# Patient Record
Sex: MALE | Race: OTHER | HISPANIC OR LATINO | URBAN - METROPOLITAN AREA
[De-identification: names, ages, dates, MRNs, and addresses within clinical notes are randomized per-mention and may not be internally consistent; named-entity substitution may affect disease eponyms.]

---

## 2022-02-08 ENCOUNTER — INPATIENT (INPATIENT)
Facility: HOSPITAL | Age: 72
LOS: 1 days | Discharge: HOME | DRG: 309 | End: 2022-02-10
Attending: HOSPITALIST | Admitting: STUDENT IN AN ORGANIZED HEALTH CARE EDUCATION/TRAINING PROGRAM
Payer: MEDICARE

## 2022-02-08 VITALS
TEMPERATURE: 98 F | OXYGEN SATURATION: 99 % | WEIGHT: 169.98 LBS | HEIGHT: 70 IN | HEART RATE: 97 BPM | RESPIRATION RATE: 20 BRPM

## 2022-02-08 DIAGNOSIS — Z78.9 OTHER SPECIFIED HEALTH STATUS: Chronic | ICD-10-CM

## 2022-02-08 DIAGNOSIS — I48.91 UNSPECIFIED ATRIAL FIBRILLATION: ICD-10-CM

## 2022-02-08 LAB
ALBUMIN SERPL ELPH-MCNC: 3.8 G/DL — SIGNIFICANT CHANGE UP (ref 3.3–5.2)
ALP SERPL-CCNC: 78 U/L — SIGNIFICANT CHANGE UP (ref 40–120)
ALT FLD-CCNC: 15 U/L — SIGNIFICANT CHANGE UP
ANION GAP SERPL CALC-SCNC: 8 MMOL/L — SIGNIFICANT CHANGE UP (ref 5–17)
AST SERPL-CCNC: 19 U/L — SIGNIFICANT CHANGE UP
BASOPHILS # BLD AUTO: 0.06 K/UL — SIGNIFICANT CHANGE UP (ref 0–0.2)
BASOPHILS NFR BLD AUTO: 0.8 % — SIGNIFICANT CHANGE UP (ref 0–2)
BILIRUB SERPL-MCNC: 1.5 MG/DL — SIGNIFICANT CHANGE UP (ref 0.4–2)
BUN SERPL-MCNC: 18 MG/DL — SIGNIFICANT CHANGE UP (ref 8–20)
CALCIUM SERPL-MCNC: 9 MG/DL — SIGNIFICANT CHANGE UP (ref 8.6–10.2)
CHLORIDE SERPL-SCNC: 105 MMOL/L — SIGNIFICANT CHANGE UP (ref 98–107)
CO2 SERPL-SCNC: 26 MMOL/L — SIGNIFICANT CHANGE UP (ref 22–29)
CREAT SERPL-MCNC: 0.85 MG/DL — SIGNIFICANT CHANGE UP (ref 0.5–1.3)
D DIMER BLD IA.RAPID-MCNC: <150 NG/ML DDU — SIGNIFICANT CHANGE UP
EOSINOPHIL # BLD AUTO: 0.26 K/UL — SIGNIFICANT CHANGE UP (ref 0–0.5)
EOSINOPHIL NFR BLD AUTO: 3.3 % — SIGNIFICANT CHANGE UP (ref 0–6)
GLUCOSE SERPL-MCNC: 95 MG/DL — SIGNIFICANT CHANGE UP (ref 70–99)
HCT VFR BLD CALC: 44.5 % — SIGNIFICANT CHANGE UP (ref 39–50)
HGB BLD-MCNC: 15.1 G/DL — SIGNIFICANT CHANGE UP (ref 13–17)
IMM GRANULOCYTES NFR BLD AUTO: 0.3 % — SIGNIFICANT CHANGE UP (ref 0–1.5)
LYMPHOCYTES # BLD AUTO: 1.76 K/UL — SIGNIFICANT CHANGE UP (ref 1–3.3)
LYMPHOCYTES # BLD AUTO: 22.4 % — SIGNIFICANT CHANGE UP (ref 13–44)
MCHC RBC-ENTMCNC: 30.8 PG — SIGNIFICANT CHANGE UP (ref 27–34)
MCHC RBC-ENTMCNC: 33.9 GM/DL — SIGNIFICANT CHANGE UP (ref 32–36)
MCV RBC AUTO: 90.6 FL — SIGNIFICANT CHANGE UP (ref 80–100)
MONOCYTES # BLD AUTO: 0.78 K/UL — SIGNIFICANT CHANGE UP (ref 0–0.9)
MONOCYTES NFR BLD AUTO: 9.9 % — SIGNIFICANT CHANGE UP (ref 2–14)
NEUTROPHILS # BLD AUTO: 4.96 K/UL — SIGNIFICANT CHANGE UP (ref 1.8–7.4)
NEUTROPHILS NFR BLD AUTO: 63.3 % — SIGNIFICANT CHANGE UP (ref 43–77)
NT-PROBNP SERPL-SCNC: 649 PG/ML — HIGH (ref 0–300)
PLATELET # BLD AUTO: 104 K/UL — LOW (ref 150–400)
POTASSIUM SERPL-MCNC: 3.7 MMOL/L — SIGNIFICANT CHANGE UP (ref 3.5–5.3)
POTASSIUM SERPL-SCNC: 3.7 MMOL/L — SIGNIFICANT CHANGE UP (ref 3.5–5.3)
PROT SERPL-MCNC: 6.4 G/DL — LOW (ref 6.6–8.7)
RBC # BLD: 4.91 M/UL — SIGNIFICANT CHANGE UP (ref 4.2–5.8)
RBC # FLD: 13 % — SIGNIFICANT CHANGE UP (ref 10.3–14.5)
SARS-COV-2 RNA SPEC QL NAA+PROBE: SIGNIFICANT CHANGE UP
SODIUM SERPL-SCNC: 139 MMOL/L — SIGNIFICANT CHANGE UP (ref 135–145)
TROPONIN T SERPL-MCNC: <0.01 NG/ML — SIGNIFICANT CHANGE UP (ref 0–0.06)
TSH SERPL-MCNC: 0.78 UIU/ML — SIGNIFICANT CHANGE UP (ref 0.27–4.2)
WBC # BLD: 7.84 K/UL — SIGNIFICANT CHANGE UP (ref 3.8–10.5)
WBC # FLD AUTO: 7.84 K/UL — SIGNIFICANT CHANGE UP (ref 3.8–10.5)

## 2022-02-08 PROCEDURE — 99284 EMERGENCY DEPT VISIT MOD MDM: CPT

## 2022-02-08 PROCEDURE — 71045 X-RAY EXAM CHEST 1 VIEW: CPT | Mod: 26

## 2022-02-08 PROCEDURE — 99222 1ST HOSP IP/OBS MODERATE 55: CPT

## 2022-02-08 PROCEDURE — 70450 CT HEAD/BRAIN W/O DYE: CPT | Mod: 26,MA

## 2022-02-08 PROCEDURE — 70551 MRI BRAIN STEM W/O DYE: CPT | Mod: 26

## 2022-02-08 PROCEDURE — 99223 1ST HOSP IP/OBS HIGH 75: CPT

## 2022-02-08 PROCEDURE — 93306 TTE W/DOPPLER COMPLETE: CPT | Mod: 26

## 2022-02-08 RX ORDER — INFLUENZA VIRUS VACCINE 15; 15; 15; 15 UG/.5ML; UG/.5ML; UG/.5ML; UG/.5ML
0.7 SUSPENSION INTRAMUSCULAR ONCE
Refills: 0 | Status: DISCONTINUED | OUTPATIENT
Start: 2022-02-08 | End: 2022-02-10

## 2022-02-08 RX ORDER — METOPROLOL TARTRATE 50 MG
25 TABLET ORAL ONCE
Refills: 0 | Status: COMPLETED | OUTPATIENT
Start: 2022-02-08 | End: 2022-02-08

## 2022-02-08 RX ORDER — ENOXAPARIN SODIUM 100 MG/ML
77 INJECTION SUBCUTANEOUS EVERY 12 HOURS
Refills: 0 | Status: DISCONTINUED | OUTPATIENT
Start: 2022-02-08 | End: 2022-02-09

## 2022-02-08 RX ORDER — ASPIRIN/CALCIUM CARB/MAGNESIUM 324 MG
81 TABLET ORAL DAILY
Refills: 0 | Status: DISCONTINUED | OUTPATIENT
Start: 2022-02-08 | End: 2022-02-08

## 2022-02-08 RX ORDER — ENOXAPARIN SODIUM 100 MG/ML
40 INJECTION SUBCUTANEOUS DAILY
Refills: 0 | Status: DISCONTINUED | OUTPATIENT
Start: 2022-02-08 | End: 2022-02-08

## 2022-02-08 RX ORDER — METOPROLOL TARTRATE 50 MG
5 TABLET ORAL EVERY 6 HOURS
Refills: 0 | Status: DISCONTINUED | OUTPATIENT
Start: 2022-02-08 | End: 2022-02-10

## 2022-02-08 RX ORDER — ASPIRIN/CALCIUM CARB/MAGNESIUM 324 MG
81 TABLET ORAL DAILY
Refills: 0 | Status: DISCONTINUED | OUTPATIENT
Start: 2022-02-08 | End: 2022-02-09

## 2022-02-08 RX ORDER — METOPROLOL TARTRATE 50 MG
25 TABLET ORAL EVERY 6 HOURS
Refills: 0 | Status: DISCONTINUED | OUTPATIENT
Start: 2022-02-08 | End: 2022-02-09

## 2022-02-08 RX ADMIN — Medication 25 MILLIGRAM(S): at 18:29

## 2022-02-08 RX ADMIN — ENOXAPARIN SODIUM 77 MILLIGRAM(S): 100 INJECTION SUBCUTANEOUS at 18:30

## 2022-02-08 RX ADMIN — Medication 25 MILLIGRAM(S): at 11:24

## 2022-02-08 NOTE — ED ADULT NURSE NOTE - OBJECTIVE STATEMENT
Patient states hes been having chest pain on and off for last 2 weeks with SOB and right arm weakness. States also having back pain and loss ob balance

## 2022-02-08 NOTE — ED PROVIDER NOTE - PHYSICAL EXAMINATION
Head: atraumatic, normacephalic  Face: atraumatic, no crepitus no orbiral/maxillary/mandibular ttp  throat: uvula midline no exudates  eyes: perrla eomi  heart: rrr s1s2  lungs: ctab  abd: soft, nt nd +bs no rebound/guarding no cva ttp  skin: warm  LE: no swelling, no calf ttp  back: no midline cervical/thoracic/lumbar ttp  NEURO: aaox 3 cn iii-xii intact strength 5/5 normal finger to nose

## 2022-02-08 NOTE — H&P ADULT - HISTORY OF PRESENT ILLNESS
Patient is a 70 yo M w/ no known PMH presenting with chief complaint of palpitations. History obtained from patient, chart and patient's daughter at bedside. Patient is visiting for [place] and noted to have symptoms of chest pain, SOB, palpitations, and dizziness starting 3 weeks ago. The symptoms have been intermittent. The chest pain is radiating to the shoulders. Starting 3 days ago, patient developed symptoms of slurred speech and confusion. Per daughter, patient has "spaced out" and takes a long time to answer questions. She denies any underlying medical history and he is on no medications. At the time of evaluation, patient states he feels better.    Patient denies fever, chills, orthopnea/PND, LE swelling, abdominal pain, cough, wheezing, nausea, vomiting, diarrhea, constipation, syncope, bloody bowel movements, melena, weakness,  Patient is a 70 yo M w/ no known PMH presenting with chief complaint of palpitations. History obtained from patient, chart and patient's daughter at bedside. Patient is visiting for [place] and noted to have symptoms of chest pain, SOB, palpitations, and dizziness starting 3 weeks ago. The symptoms have been intermittent. The chest pain is radiating to the shoulders. Starting 3 days ago, patient developed symptoms of slurred speech and confusion. Per daughter, patient has "spaced out" and takes a long time to answer questions. She denies any underlying medical history and he is on no medications. At the time of evaluation, patient states he feels better.    Patient denies fever, chills, orthopnea/PND, LE swelling, abdominal pain, cough, wheezing, nausea, vomiting, diarrhea, constipation, syncope, bloody bowel movements, melena, weakness.

## 2022-02-08 NOTE — ED PROVIDER NOTE - OBJECTIVE STATEMENT
70yo M no PMhx pw cp/sob/palpitations x 3 days. +lightheadedness. +exertional dyspnea. denies orthopnea. daughter notes that pt lives in [place] came 3 months ago was supposed to go back 3 days ago but was not feeling well, also had an episode of slurred speech and confusion 3 days ago. denies weakness. as per daughter notes pt is still with episodes of confusion since - takes a long time to answer sometimes or says he forgot what he was going to say.  as per daughter also notes unsteady gait x 3 weeks. Denies f/c/n/v/cough/rash/headacheabd.pain/d/c/dysuria/hematuria. no sick contacts. no hx of dvt/pe

## 2022-02-08 NOTE — CONSULT NOTE ADULT - SUBJECTIVE AND OBJECTIVE BOX
[facility]ARDI[facility]                                                       [facility]                                                           Office:  [address] [address]                                                          Telephone: [phone]. Fax:[phone]                                                                        CARDIOLOGY CONSULTATION NOTE                                                                                             Consult requested by:  Luis Dwyer  Reason for Consultation: new onset Afib  History obtained by: Patient and medical record   obtained: No  Cardiologist: none     COVID Status: negative       Chief complaint:    Patient is a 71y old  Male who presents with a chief complaint of New Onset Afib (2022 13:15)        HPI:  Patient is a 72 yo M w/ no known PMH presents with chief complaint of palpitation with associated dizziness and SOB starting about 3 weeks ago.  Patient is visiting from [place] and was supposed to return home this week but was brought to ED by daughter due to 3 days of intermittent slurred speech, confusion and slow repsonse times.  Per daughter, patient has "spaced out" and takes a long time to answer questions. She denies any underlying medical history and he is on no medications. At the time of evaluation, patient states he feels better. Patient denies fever, chills, orthopnea/PND, LE swelling, abdominal pain, cough, wheezing, nausea, vomiting, diarrhea, constipation, syncope, bloody bowel movements, melena, weakness.       REVIEW OF SYMPTOMS:     CONSTITUTIONAL: No fever, no weight loss, no fatigue, no weight gain   ENMT:  No difficulty hearing, tinnitus, vertigo; No sinus or throat pain  NECK: No pain or stiffness  CARDIOVASCULAR: reports chest pain , pressure like in nature, palpitations, SANTANA and Paroxsymal nocturnal dyspnea (sleeps with 2 pillows) and dizziness  no syncope, , no Orthopnea,   RESPIRATORY: Dyspnea on exertion, no Shortness of breath, no cough, no wheezing  : No dysuria, no hematuria   GI: No dark color stool, no melena, no diarrhea, no constipation, no abdominal pain   NEURO: No headache, no dizziness, no slurred speech   MUSCULOSKELETAL: No joint pain or swelling; No muscle, back, or extremity pain  PSYCH: No agitation, no anxiety.    ALL OTHER REVIEW OF SYSTEMS ARE NEGATIVE.      PREVIOUS DIAGNOSTIC TESTING  ECHO FINDINGS:    PENDING  STRESS FINDINGS:  CATHETERIZATION FINDINGS:         ALLERGIES: Allergies  No Known Allergies    PAST MEDICAL HISTORY  No known health problems    PAST SURGICAL HISTORY  No pertinent past surgical history        FAMILY HISTORY:  Mother -  in childbirth  Father -  of stroke       SOCIAL HISTORY:  Denies smoking/alcohol/drugs    CURRENT MEDICATIONS:  metoprolol tartrate Injectable 5 milliGRAM(s) IV Push every 6 hours PRN   aspirin  chewable  enoxaparin Injectable        HOME MEDICATIONS:      Vital Signs Last 24 Hrs  T(C): 36.4 (2022 08:57), Max: 36.4 (2022 08:57)  T(F): 97.6 (2022 08:57), Max: 97.6 (2022 08:57)  HR: 102 (2022 11:25) (97 - 102)  BP: 122/87 (2022 11:25) (115/77 - 122/87)  BP(mean): --  RR: 20 (2022 08:57) (20 - 20)  SpO2: 99% (2022 08:57) (99% - 99%)      PHYSICAL EXAM:  Constitutional: Comfortable . No acute distress.   HEENT: Atraumatic and normocephalic , neck is supple . no JVD. No carotid bruit. PEERL   CNS: A&Ox3. No focal deficits. EOMI. Cranial nerves II-IX are intact.   Respiratory: CTAB, unlabored   Cardiovascular: S1S2 RRR. No murmur/rubs or gallop.  Gastrointestinal: Soft non-tender and non distended . +Bowel sounds. negative Yeboah's sign.  Extremities: No edema.   Psychiatric: Calm . no agitation.  Skin: No skin rash/ulcers visualized to face, hands or feet.    Intake and output:     LABS:                        15.1   7.84  )-----------( 104      ( 2022 10:28 )             44.5     02-08    139  |  105  |  18.0  ----------------------------<  95  3.7   |  26.0  |  0.85    Ca    9.0      2022 10:28    TPro  6.4<L>  /  Alb  3.8  /  TBili  1.5  /  DBili  x   /  AST  19  /  ALT  15  /  AlkPhos  78  02-08    CARDIAC MARKERS ( 2022 10:28 )  x     / <0.01 ng/mL / x     / x     / x        ;p-BNP=Serum Pro-Brain Natriuretic Peptide: 649 pg/mL ( @ 10:28)          INTERPRETATION OF TELEMETRY: Afib currently rate controlled 80s to 90s  ECG: Reviewed by me.  Afib with RVR    RADIOLOGY & ADDITIONAL STUDIES:    X-ray:  reviewed by me.   CT scan:   MRI:

## 2022-02-08 NOTE — ED ADULT TRIAGE NOTE - AS HEIGHT TYPE
"Transplant Social Work Services Phone Call      Data: Received message that pt/family received a \"[id number]\" form in the mail, had questions about it, requesting call from SW. Called pt, introduced self and role of SW. Discussed reason for call. Pt stated that her mom opened up her mail at home and this form had come and she wondered if there was anything she had to do with it. Explained that I was unfamiliar with that form, but discussed Medicare re-entitlement form. Instructed that if it was this form, she would need to take it to local social security office for Medicare. Explained that she had time to do this and it wasn't urgent (pt is in [place] and will be returning home to [place] soon). Pt plans to review the form further when she gets home to [place] and will call SW back if she has additional questions.   Intervention: Phone call   Assessment: Pt was pleasant and receptive to SW.   Education provided by SW: NA   Plan: SW available to support and assist with ongoing social service needs.    Fabiana Arroyo, ZACHARY, [facility]  - Coverage for D[facility]   Outpatient Kidney/Pancreas[facility] Transplant   Ph. [phone]  [email or web address]     NO LETTER    "
stated

## 2022-02-08 NOTE — H&P ADULT - NSHPPHYSICALEXAM_GEN_ALL_CORE
Please see refill request for Adderall    I did notify the family we will send in refill today   Vital Signs Last 24 Hrs  T(F): 97.6 (08 Feb 2022 08:57), Max: 97.6 (08 Feb 2022 08:57)  HR: 102 (08 Feb 2022 11:25) (97 - 102)  BP: 122/87 (08 Feb 2022 11:25) (115/77 - 122/87)  RR: 20 (08 Feb 2022 08:57) (20 - 20)  SpO2: 99% (08 Feb 2022 08:57) (99% - 99%)    Physical Exam:  Constitutional: NAD  HEENT: NC/AT, PERRL, EOMI, trachea midline, no JVD  Respiratory: CTA bilaterally, symmetrical chest rise  Cardiovascular: Tachycardic, irr/irr, no m/g/r  Gastrointestinal: Soft, NT/ND, BS+  Vascular: 2+ peripheral pulses  Neurological: A/O x 3, no focal neurological deficits  Psych: Fair mood/affect  Musculoskeletal: No edema, cyanosis, deformities. ROM normal  Skin: No obvious rash, lesions. No jaundice.

## 2022-02-08 NOTE — ED PROVIDER NOTE - CLINICAL SUMMARY MEDICAL DECISION MAKING FREE TEXT BOX
new onset afib on ekg will fu labs cxr metoprolol; episode of confusion/slurred speech concerning for cva/tia--ct head; cardiology consult--admit new onset afib on ekg will fu labs cxr metoprolol; episode of confusion/slurred speech concerning for cva/tia--ct head; cardiology consult--admit for new afib and mri for tia work up

## 2022-02-08 NOTE — ED PROVIDER NOTE - NS ED ROS FT
Denies f/c/n/v// cough/rash/headache//abd.pain/d/c/dysuria/hematuria  +CP/SOB/PALPITATIONS +confusion/slurred speech/unsteady gait

## 2022-02-08 NOTE — H&P ADULT - ASSESSMENT
Patient is a 70 yo M w/ no known PMH presenting with chief complaint of palpitations. Admitted for new onset AFib.    AFib w/ RVR  - S/p Toprol  - EKG reviewed, cardiac monitoring  - Cardiology consulted, follow up recommendations  - [id number]: 1, start ASA  - Trop negative, elevated BNP  - Lopressor IVP prn for RVR  - TSH WNL  - TTE    Slurred speech, dizziness. R/o CVA vs TIA  - Neuro checks  - TTE  - Cardiac monitoring  - Carotid Duplex  - MRI brain    Thrombocytopenia  - Monitor    DVT ppx: Lovenox     Patient is a 72 yo M w/ no known PMH presenting with chief complaint of palpitations. Admitted for new onset AFib.    AFib w/ RVR  - S/p Lopressor PO  - EKG reviewed, cardiac monitoring  - Cardiology consulted, follow up recommendations  - [id number]: 1  - Trop negative, elevated BNP  - Lopressor IVP prn for RVR  - TSH WNL  - TTE    Slurred speech, dizziness. R/o CVA vs TIA  - Neuro checks  - TTE  - Cardiac monitoring  - Carotid Duplex  - MRI brain    Thrombocytopenia  - Monitor    DVT ppx: Lovenox    Daughter updated at bedside

## 2022-02-08 NOTE — CONSULT NOTE ADULT - ASSESSMENT
Patient is a 72 yo M w/ no known PMH presents with chief complaint of palpitation with associated dizziness and SOB starting about 3 weeks ago.  Patient is visiting from [place] and was supposed to return home this week but was brought to ED by daughter due to 3 days of intermittent slurred speech, confusion and slow repsonse times.  Per daughter, patient has "spaced out" and takes a long time to answer questions. She denies any underlying medical history and he is on no medications. At the time of evaluation, patient states he feels better. Patient denies fever, chills, orthopnea/PND, LE swelling, abdominal pain, cough, wheezing, nausea, vomiting, diarrhea, constipation, syncope, bloody bowel movements, melena, weakness.     New onset Atrial Fibrillation with RVR  - currently rate controlled after 1 dose metoprolol 25mg PO  - continue metroprolol 25mg PO q6h hold for SBP <100, HR <60  - No previous cardiac or ischemic workup  - TSH WNL  - TTE ordered  - Lovenox ordered for AC    TIA  - family history of stroke (father)  - CT head negative for acute intracranial pathology  - MRI head pending  - US carotids pending    Acute Chest Pain  - troponin - x 1  - continue to t     Patient is a 72 yo M w/ no known PMH presents with chief complaint of palpitation with associated dizziness and SOB starting about 3 weeks ago.  [place] from [place] and was supposed to return home this week but was brought to ED by daughter due to 3 days of intermittent slurred speech, confusion and slow repsonse times.  Per daughter, patient has "spaced out" and takes a long time to answer questions. She denies any underlying medical history and he is on no medications. At the time of evaluation, patient states he feels better. Patient denies fever, chills, orthopnea/PND, LE swelling, abdominal pain, cough, wheezing, nausea, vomiting, diarrhea, constipation, syncope, bloody bowel movements, melena, weakness.     New onset Atrial Fibrillation with RVR  - currently rate controlled after 1 dose metoprolol 25mg PO  - continue metroprolol 25mg PO q6h hold for SBP <100, HR <60  - No previous cardiac or ischemic workup  - TSH WNL  - TTE ordered  - Lovenox ordered for AC    TIA  - family history of stroke (father)  - on ASA 81mg  - CT head negative for acute intracranial pathology  - MRI head pending  - US carotids pending    Acute Chest Pain  - troponin negative x 1  - continue to trend troponin  - continue lovenox, ASA  - cardiology will follow

## 2022-02-08 NOTE — CONSULT NOTE ADULT - TIME BILLING
PT seen and examined  Plan of care DW NP.   Pt with palpitations for the past 2-3 weeks. He is in afib with RVR.   Also with confusion and slurred speech that has now improved.   There is no ST/T changes on EKG.   CT head negative for bleed.   Anticoagulate with lovenox.   MRI of head and TTE. Neuro evaluation.   Carotid duplex.   With rapid afib, recommend to check CE.   We will follow with you. PT seen and examined  Plan of care DW NP.   Pt with palpitations for the past 2-3 weeks. He is in afib with RVR.   Also with confusion and slurred speech that has now improved.   There is no ST/T changes on EKG. Pt is complaining of recurrent cp, not related to activity. not present while in hospital with afib, RVR. plan for stress test. npo post midnight  CT head negative for bleed.   Anticoagulate with lovenox.   MRI of head and TTE. Neuro evaluation.   Carotid duplex.   With rapid afib, recommend to check CE.   We will follow with you.

## 2022-02-08 NOTE — ED ADULT TRIAGE NOTE - CHIEF COMPLAINT QUOTE
"I have been having chest pain and SOB for a couple of days" pt also co feeling unsteady for weeks and more confused as per family for about one week pt AOX3 at time of triage.

## 2022-02-08 NOTE — PATIENT PROFILE ADULT - FALL HARM RISK - UNIVERSAL INTERVENTIONS
Bed in lowest position, wheels locked, appropriate side rails in place/Call bell, personal items and telephone in reach/Instruct patient to call for assistance before getting out of bed or chair/Non-slip footwear when patient is out of bed/Nice to call system/Physically safe environment - no spills, clutter or unnecessary equipment/Purposeful Proactive Rounding/Room/bathroom lighting operational, light cord in reach

## 2022-02-09 LAB
A1C WITH ESTIMATED AVERAGE GLUCOSE RESULT: 4.9 % — SIGNIFICANT CHANGE UP (ref 4–5.6)
ALBUMIN SERPL ELPH-MCNC: 3.9 G/DL — SIGNIFICANT CHANGE UP (ref 3.3–5.2)
ALP SERPL-CCNC: 75 U/L — SIGNIFICANT CHANGE UP (ref 40–120)
ALT FLD-CCNC: 16 U/L — SIGNIFICANT CHANGE UP
ANION GAP SERPL CALC-SCNC: 11 MMOL/L — SIGNIFICANT CHANGE UP (ref 5–17)
AST SERPL-CCNC: 17 U/L — SIGNIFICANT CHANGE UP
BILIRUB SERPL-MCNC: 1.6 MG/DL — SIGNIFICANT CHANGE UP (ref 0.4–2)
BUN SERPL-MCNC: 22.3 MG/DL — HIGH (ref 8–20)
CALCIUM SERPL-MCNC: 9 MG/DL — SIGNIFICANT CHANGE UP (ref 8.6–10.2)
CHLORIDE SERPL-SCNC: 106 MMOL/L — SIGNIFICANT CHANGE UP (ref 98–107)
CHOLEST SERPL-MCNC: 177 MG/DL — SIGNIFICANT CHANGE UP
CO2 SERPL-SCNC: 25 MMOL/L — SIGNIFICANT CHANGE UP (ref 22–29)
CREAT SERPL-MCNC: 0.82 MG/DL — SIGNIFICANT CHANGE UP (ref 0.5–1.3)
ESTIMATED AVERAGE GLUCOSE: 94 MG/DL — SIGNIFICANT CHANGE UP (ref 68–114)
GLUCOSE SERPL-MCNC: 77 MG/DL — SIGNIFICANT CHANGE UP (ref 70–99)
HCT VFR BLD CALC: 44.5 % — SIGNIFICANT CHANGE UP (ref 39–50)
HCV AB S/CO SERPL IA: 15.84 S/CO — HIGH (ref 0–0.99)
HCV AB SERPL-IMP: REACTIVE
HDLC SERPL-MCNC: 57 MG/DL — SIGNIFICANT CHANGE UP
HGB BLD-MCNC: 15.1 G/DL — SIGNIFICANT CHANGE UP (ref 13–17)
LIPID PNL WITH DIRECT LDL SERPL: 107 MG/DL — HIGH
MAGNESIUM SERPL-MCNC: 2.1 MG/DL — SIGNIFICANT CHANGE UP (ref 1.6–2.6)
MCHC RBC-ENTMCNC: 30.7 PG — SIGNIFICANT CHANGE UP (ref 27–34)
MCHC RBC-ENTMCNC: 33.9 GM/DL — SIGNIFICANT CHANGE UP (ref 32–36)
MCV RBC AUTO: 90.4 FL — SIGNIFICANT CHANGE UP (ref 80–100)
NON HDL CHOLESTEROL: 120 MG/DL — SIGNIFICANT CHANGE UP
PHOSPHATE SERPL-MCNC: 3.2 MG/DL — SIGNIFICANT CHANGE UP (ref 2.4–4.7)
PLATELET # BLD AUTO: 99 K/UL — LOW (ref 150–400)
POTASSIUM SERPL-MCNC: 4.3 MMOL/L — SIGNIFICANT CHANGE UP (ref 3.5–5.3)
POTASSIUM SERPL-SCNC: 4.3 MMOL/L — SIGNIFICANT CHANGE UP (ref 3.5–5.3)
PROT SERPL-MCNC: 6.5 G/DL — LOW (ref 6.6–8.7)
RBC # BLD: 4.92 M/UL — SIGNIFICANT CHANGE UP (ref 4.2–5.8)
RBC # FLD: 13.1 % — SIGNIFICANT CHANGE UP (ref 10.3–14.5)
SODIUM SERPL-SCNC: 142 MMOL/L — SIGNIFICANT CHANGE UP (ref 135–145)
TRIGL SERPL-MCNC: 65 MG/DL — SIGNIFICANT CHANGE UP
WBC # BLD: 7.6 K/UL — SIGNIFICANT CHANGE UP (ref 3.8–10.5)
WBC # FLD AUTO: 7.6 K/UL — SIGNIFICANT CHANGE UP (ref 3.8–10.5)

## 2022-02-09 PROCEDURE — 93880 EXTRACRANIAL BILAT STUDY: CPT | Mod: 26

## 2022-02-09 PROCEDURE — 99233 SBSQ HOSP IP/OBS HIGH 50: CPT

## 2022-02-09 PROCEDURE — 93018 CV STRESS TEST I&R ONLY: CPT

## 2022-02-09 PROCEDURE — 93016 CV STRESS TEST SUPVJ ONLY: CPT

## 2022-02-09 PROCEDURE — 78452 HT MUSCLE IMAGE SPECT MULT: CPT | Mod: 26

## 2022-02-09 PROCEDURE — 99232 SBSQ HOSP IP/OBS MODERATE 35: CPT | Mod: 25

## 2022-02-09 RX ORDER — METOPROLOL TARTRATE 50 MG
100 TABLET ORAL AT BEDTIME
Refills: 0 | Status: DISCONTINUED | OUTPATIENT
Start: 2022-02-09 | End: 2022-02-10

## 2022-02-09 RX ORDER — APIXABAN 2.5 MG/1
5 TABLET, FILM COATED ORAL EVERY 12 HOURS
Refills: 0 | Status: DISCONTINUED | OUTPATIENT
Start: 2022-02-09 | End: 2022-02-10

## 2022-02-09 RX ORDER — LOSARTAN POTASSIUM 100 MG/1
100 TABLET, FILM COATED ORAL DAILY
Refills: 0 | Status: DISCONTINUED | OUTPATIENT
Start: 2022-02-09 | End: 2022-02-10

## 2022-02-09 RX ADMIN — Medication 25 MILLIGRAM(S): at 00:36

## 2022-02-09 RX ADMIN — APIXABAN 5 MILLIGRAM(S): 2.5 TABLET, FILM COATED ORAL at 17:55

## 2022-02-09 RX ADMIN — ENOXAPARIN SODIUM 77 MILLIGRAM(S): 100 INJECTION SUBCUTANEOUS at 05:53

## 2022-02-09 RX ADMIN — Medication 25 MILLIGRAM(S): at 05:53

## 2022-02-09 RX ADMIN — Medication 100 MILLIGRAM(S): at 21:16

## 2022-02-09 NOTE — PROGRESS NOTE ADULT - SUBJECTIVE AND OBJECTIVE BOX
CHIEF COMPLAINT:Patient is a 71y old  Male who presents with a chief complaint of New Onset Afib (08 Feb 2022 15:15)    INTERVAL HISTORY:  PT seen in stress test area.   Doing well,  Hr is better controlled.  No cp overnight    Allergies  No Known Allergies  	  MEDICATIONS:  metoprolol tartrate 25 milliGRAM(s) Oral every 6 hours  metoprolol tartrate Injectable 5 milliGRAM(s) IV Push every 6 hours PRN  aspirin  chewable 81 milliGRAM(s) Oral daily  enoxaparin Injectable 77 milliGRAM(s) SubCutaneous every 12 hours  influenza  Vaccine (HIGH DOSE) 0.7 milliLiter(s) IntraMuscular once    PHYSICAL EXAM:  T(C): 36.7 (02-09-22 @ 05:52), Max: 37.1 (02-08-22 @ 18:23)  HR: 82 (02-09-22 @ 05:52) (82 - 100)  BP: 157/99 (02-09-22 @ 05:52) (125/88 - 157/99)  RR: 18 (02-09-22 @ 05:52) (18 - 18)  SpO2: 96% (02-09-22 @ 05:52) (95% - 98%)    Appearance: Normal	  HEENT:   NC/AT  Eye: Pink Conjunctiva  Lungs: CTA B/L  CVS: IRRR, Normal S1 and S2, No Edema  Pulses: Normal distal pulses.  Neuro: A&O x3    TTE and MRI results reviewed.  Also reviewed carotid duplex imaging    NST: No ischemia. no ekg changes.     LABS:	 	                          15.1   7.60  )-----------( 99       ( 09 Feb 2022 07:01 )             44.5     02-09    142  |  106  |  22.3<H>  ----------------------------<  77  4.3   |  25.0  |  0.82    Ca    9.0      09 Feb 2022 07:01  Phos  3.2     02-09  Mg     2.1     02-09    TPro  6.5<L>  /  Alb  3.9  /  TBili  1.6  /  DBili  x   /  AST  17  /  ALT  16  /  AlkPhos  75  02-09      ASSESSMENT/PLAN: 	  1. Afib, rate control with toprol xl 100 mg daily  2. Pt with high [id number] score. DC lovenox and start AC with eliquis 5 mg bid. Compliance with medication DW pt.  3. Add losartan for bp control  4. Pt with carotid plaque, start lipitor 20 mg daily  5. No stroke on MRI  FU with us at the office  Thank you for allowing us to aid your patient's care  
ROMERO VALENTINE    [id number]    71y      Male    CC: afib    INTERVAL HPI/OVERNIGHT EVENTS: pt seen and examined. no acute events o/n     REVIEW OF SYSTEMS:    CONSTITUTIONAL: No fever, weight loss, or fatigue  RESPIRATORY: No cough, wheezing, hemoptysis; No shortness of breath  CARDIOVASCULAR: No chest pain, palpitations  GASTROINTESTINAL: No abdominal or epigastric pain. No nausea, vomiting  NEUROLOGICAL: No headaches, memory loss, loss of strength.    Vital Signs Last 24 Hrs  T(C): 36.8 (09 Feb 2022 11:40), Max: 37.1 (08 Feb 2022 18:23)  T(F): 98.3 (09 Feb 2022 11:40), Max: 98.7 (08 Feb 2022 18:23)  HR: 93 (09 Feb 2022 11:40) (82 - 100)  BP: 129/91 (09 Feb 2022 11:40) (125/88 - 157/99)  BP(mean): --  RR: 17 (09 Feb 2022 11:40) (17 - 18)  SpO2: 96% (09 Feb 2022 05:52) (95% - 98%)    PHYSICAL EXAM:    GENERAL: NAD  HEENT: +EOMI  NECK: soft, supple  CHEST/LUNG: Clear to auscultation bilaterally; No wheezing  HEART: S1S2+, irregularly irregular   ABDOMEN: Soft, Nontender, Nondistended; Bowel sounds present  SKIN: warm, dry  NEURO: AAOX3, no focal deficits  PSYCH: normal affect     LABS:                        15.1   7.60  )-----------( 99       ( 09 Feb 2022 07:01 )             44.5     02-09    142  |  106  |  22.3<H>  ----------------------------<  77  4.3   |  25.0  |  0.82    Ca    9.0      09 Feb 2022 07:01  Phos  3.2     02-09  Mg     2.1     02-09    TPro  6.5<L>  /  Alb  3.9  /  TBili  1.6  /  DBili  x   /  AST  17  /  ALT  16  /  AlkPhos  75  02-09            MEDICATIONS  (STANDING):  apixaban 5 milliGRAM(s) Oral every 12 hours  influenza  Vaccine (HIGH DOSE) 0.7 milliLiter(s) IntraMuscular once  losartan 100 milliGRAM(s) Oral daily  metoprolol succinate  milliGRAM(s) Oral at bedtime    MEDICATIONS  (PRN):  metoprolol tartrate Injectable 5 milliGRAM(s) IV Push every 6 hours PRN HR > 110      RADIOLOGY & ADDITIONAL TESTS:    < from: TTE Echo Complete w/o Contrast w/ Doppler (02.08.22 @ 15:15) >    Summary:   1. Left ventricular ejection fraction, by visual estimation, is60 to   65%.   2. Normal global left ventricular systolic function.   3. The mitral in-flow pattern reveals no discernable A-wave, therefore   no comment on diastolic function can be made.   4. Normal right ventricular size and function, estimated PASP 23 mmHg.   5. Mild mitral valve regurgitation.   6. Mild tricuspid regurgitation.   7. There is no evidence of pericardial effusion.    Jaspreet Shannon DO Electronically signed on 2/8/2022 at 5:52:14 PM    < end of copied text >  < from: Nuclear Stress Test-Pharmacologic (02.09.22 @ 07:54) >  IMPRESSIONS:  * The left ventricle was normal in size. Normal myocardial  perfusion scan, with no evidence of infarction or  inducible ischemia.  * Gated wall motion analysis is performed, and shows  normal wall motion with post stress LVEF of 58%.  * Chest Pain: Developed chest discomfort at 01:00 min of  post infusion at a HR of 113 which resolved by 3 minutes  of post infusion.  * Symptom: Chest pain.  * HR Response: Appropriate.  * BP Response: Appropriate.  * Heart Rhythm: Atrial Fibrillation.  * ECG Abnormalities: There were no diagnostic changes.  * Arrhythmia: None.    < end of copied text >    < from: US Duplex Carotid Arteries Complete, Bilateral (02.09.22 @ 11:10) >  Mild atheromatous plaque is present along the course of the right and   left common carotid arteries, somewhat more prominent in the regions of   the bifurcations.    < end of copied text >

## 2022-02-09 NOTE — PROGRESS NOTE ADULT - ASSESSMENT
71y/oM no known PMH presenting with c/o palpitations, admitted with new onset afib     New onset atrial fibrillation   -cont toprol xl 100mg qd   -cardio recs appreciated   -start eliquis   -add losartan   -add lipitor   -TTE, stress test reviewed     Slurred speech, dizziness   CVA ruled out   -mri neg   -tte reviewed   -carotid doppler: mild plaque in R and L common carotid arteries     vte ppx: eliquis     dispo: likely home in am if HR controlled

## 2022-02-10 VITALS
DIASTOLIC BLOOD PRESSURE: 72 MMHG | OXYGEN SATURATION: 94 % | TEMPERATURE: 98 F | HEART RATE: 93 BPM | SYSTOLIC BLOOD PRESSURE: 107 MMHG | RESPIRATION RATE: 19 BRPM

## 2022-02-10 PROCEDURE — 83735 ASSAY OF MAGNESIUM: CPT

## 2022-02-10 PROCEDURE — 93005 ELECTROCARDIOGRAM TRACING: CPT

## 2022-02-10 PROCEDURE — U0005: CPT

## 2022-02-10 PROCEDURE — 85027 COMPLETE CBC AUTOMATED: CPT

## 2022-02-10 PROCEDURE — 84443 ASSAY THYROID STIM HORMONE: CPT

## 2022-02-10 PROCEDURE — U0003: CPT

## 2022-02-10 PROCEDURE — 93017 CV STRESS TEST TRACING ONLY: CPT

## 2022-02-10 PROCEDURE — 85025 COMPLETE CBC W/AUTO DIFF WBC: CPT

## 2022-02-10 PROCEDURE — 70450 CT HEAD/BRAIN W/O DYE: CPT | Mod: MA

## 2022-02-10 PROCEDURE — 36415 COLL VENOUS BLD VENIPUNCTURE: CPT

## 2022-02-10 PROCEDURE — 84100 ASSAY OF PHOSPHORUS: CPT

## 2022-02-10 PROCEDURE — 80061 LIPID PANEL: CPT

## 2022-02-10 PROCEDURE — A9500: CPT

## 2022-02-10 PROCEDURE — 70551 MRI BRAIN STEM W/O DYE: CPT

## 2022-02-10 PROCEDURE — 93880 EXTRACRANIAL BILAT STUDY: CPT

## 2022-02-10 PROCEDURE — 71045 X-RAY EXAM CHEST 1 VIEW: CPT

## 2022-02-10 PROCEDURE — 93306 TTE W/DOPPLER COMPLETE: CPT

## 2022-02-10 PROCEDURE — 83036 HEMOGLOBIN GLYCOSYLATED A1C: CPT

## 2022-02-10 PROCEDURE — 87521 HEPATITIS C PROBE&RVRS TRNSC: CPT

## 2022-02-10 PROCEDURE — 86803 HEPATITIS C AB TEST: CPT

## 2022-02-10 PROCEDURE — 78452 HT MUSCLE IMAGE SPECT MULT: CPT

## 2022-02-10 PROCEDURE — 83880 ASSAY OF NATRIURETIC PEPTIDE: CPT

## 2022-02-10 PROCEDURE — 99239 HOSP IP/OBS DSCHRG MGMT >30: CPT

## 2022-02-10 PROCEDURE — 80053 COMPREHEN METABOLIC PANEL: CPT

## 2022-02-10 PROCEDURE — 85379 FIBRIN DEGRADATION QUANT: CPT

## 2022-02-10 PROCEDURE — 84484 ASSAY OF TROPONIN QUANT: CPT

## 2022-02-10 PROCEDURE — 99285 EMERGENCY DEPT VISIT HI MDM: CPT | Mod: 25

## 2022-02-10 RX ORDER — METOPROLOL TARTRATE 50 MG
1 TABLET ORAL
Qty: 30 | Refills: 0
Start: 2022-02-10 | End: 2022-03-11

## 2022-02-10 RX ORDER — LOSARTAN POTASSIUM 100 MG/1
1 TABLET, FILM COATED ORAL
Qty: 30 | Refills: 0
Start: 2022-02-10 | End: 2022-03-11

## 2022-02-10 RX ORDER — APIXABAN 2.5 MG/1
1 TABLET, FILM COATED ORAL
Qty: 60 | Refills: 0
Start: 2022-02-10 | End: 2022-03-11

## 2022-02-10 RX ADMIN — APIXABAN 5 MILLIGRAM(S): 2.5 TABLET, FILM COATED ORAL at 05:10

## 2022-02-10 RX ADMIN — LOSARTAN POTASSIUM 100 MILLIGRAM(S): 100 TABLET, FILM COATED ORAL at 05:10

## 2022-02-10 NOTE — DISCHARGE NOTE PROVIDER - NSDCMRMEDTOKEN_GEN_ALL_CORE_FT
apixaban 5 mg oral tablet: 1 tab(s) orally every 12 hours  losartan 100 mg oral tablet: 1 tab(s) orally once a day  Metoprolol Succinate  mg oral tablet, extended release: 1 tab(s) orally once a day (at bedtime)

## 2022-02-10 NOTE — DISCHARGE NOTE PROVIDER - NSDCCPCAREPLAN_GEN_ALL_CORE_FT
PRINCIPAL DISCHARGE DIAGNOSIS  Diagnosis: New onset atrial fibrillation  Assessment and Plan of Treatment: Continue eliquis and metoprolol as prescribed. Outpatient cardiology follow up on discharge. Contact info provided.      SECONDARY DISCHARGE DIAGNOSES  Diagnosis: Hypertension  Assessment and Plan of Treatment: Continue Losartan as prescribed. Follow up with PMD for further management

## 2022-02-10 NOTE — DISCHARGE NOTE NURSING/CASE MANAGEMENT/SOCIAL WORK - PATIENT PORTAL LINK FT
You can access the FollowMyHealth Patient Portal offered by [facility] by registering at the following website: [email or web address]. By joining [employer]’s FollowMyHealth portal, you will also be able to view your health information using other applications (apps) compatible with our system.

## 2022-02-10 NOTE — DISCHARGE NOTE NURSING/CASE MANAGEMENT/SOCIAL WORK - NSDCPEFALRISK_GEN_ALL_CORE
For information on Fall & Injury Prevention, visit: [email or web address] OR  [email or web address] OR  https://www.cdc.gov/steadi/patient.html

## 2022-02-10 NOTE — DISCHARGE NOTE PROVIDER - HOSPITAL COURSE
70 yo M visiting from [place] accompanied by daughter w/ no known PMH was admitted with new onset a fib. Pt was seen in consult by cardio. Pt s/p TTE and NST, both wnl.  Patient initially placed on therapeutic Lovenox and eventually transitioned to Eliquis 5mg BID upon d/c due to [id number] score of 2. During hospital stay, patient was noted with slurred speech. CT head and MRI were performed and ruled out stroke. Pt at this time is now medically stable for discharge with outpatient cardiology and PMD follow up.     ICU Vital Signs Last 24 Hrs  T(C): 37.2 (10 Feb 2022 04:30), Max: 37.2 (10 Feb 2022 04:30)  T(F): 98.9 (10 Feb 2022 04:30), Max: 98.9 (10 Feb 2022 04:30)  HR: 98 (10 Feb 2022 04:30) (93 - 115)  BP: 121/79 (10 Feb 2022 04:30) (114/85 - 129/91)  RR: 18 (10 Feb 2022 04:30) (17 - 18)  SpO2: 96% (10 Feb 2022 04:30) (96% - 98%)   70 yo M visiting from [place] accompanied by daughter w/ no known PMH was admitted with new onset a fib. Pt was seen in consult by cardio. Pt s/p TTE and NST, both wnl.  Patient initially placed on therapeutic Lovenox and eventually transitioned to Eliquis 5mg BID upon d/c due to [id number] score of 2. During hospital stay, patient was noted with slurred speech. CT head and MRI were performed and ruled out stroke. Pt at this time is now medically stable for discharge with outpatient cardiology and PMD follow up.     Vital Signs Last 24 Hrs  T(C): 37.2 (10 Feb 2022 04:30), Max: 37.2 (10 Feb 2022 04:30)  T(F): 98.9 (10 Feb 2022 04:30), Max: 98.9 (10 Feb 2022 04:30)  HR: 98 (10 Feb 2022 04:30) (93 - 115)  BP: 121/79 (10 Feb 2022 04:30) (114/85 - 129/91)  BP(mean): --  RR: 18 (10 Feb 2022 04:30) (17 - 18)  SpO2: 96% (10 Feb 2022 04:30) (96% - 98%)

## 2022-02-10 NOTE — DISCHARGE NOTE PROVIDER - CARE PROVIDER_API CALL
Noam Ann)  Cardiovascular Disease  [address], [place] [address]  Phone: [phone]  Fax: [phone]  Follow Up Time: 1 week

## 2022-02-10 NOTE — DISCHARGE NOTE PROVIDER - ATTENDING DISCHARGE PHYSICAL EXAMINATION:
Vital Signs Last 24 Hrs  T(C): 37.2 (10 Feb 2022 04:30), Max: 37.2 (10 Feb 2022 04:30)  T(F): 98.9 (10 Feb 2022 04:30), Max: 98.9 (10 Feb 2022 04:30)  HR: 98 (10 Feb 2022 04:30) (93 - 115)  BP: 121/79 (10 Feb 2022 04:30) (114/85 - 129/91)  BP(mean): --  RR: 18 (10 Feb 2022 04:30) (17 - 18)  SpO2: 96% (10 Feb 2022 04:30) (96% - 98%)    GENERAL: NAD  HEENT: +EOMI  NECK: soft, supple  CHEST/LUNG: Clear to auscultation bilaterally; No wheezing  HEART: S1S2+, irregularly irregular   ABDOMEN: Soft, Nontender, Nondistended; Bowel sounds present  SKIN: warm, dry  NEURO: AAOX3, no focal deficits  PSYCH: normal affect

## 2022-02-11 PROBLEM — Z00.00 ENCOUNTER FOR PREVENTIVE HEALTH EXAMINATION: Status: ACTIVE | Noted: 2022-02-11

## 2022-02-14 LAB — HCV RNA FLD QL NAA+PROBE: SIGNIFICANT CHANGE UP

## 2022-11-10 ENCOUNTER — OFFICE (OUTPATIENT)
Dept: URBAN - METROPOLITAN AREA CLINIC 94 | Facility: CLINIC | Age: 72
Setting detail: OPHTHALMOLOGY
End: 2022-11-10
Payer: COMMERCIAL

## 2022-11-10 DIAGNOSIS — H35.033: ICD-10-CM

## 2022-11-10 DIAGNOSIS — H25.13: ICD-10-CM

## 2022-11-10 PROBLEM — H52.4 PRESBYOPIA: Status: ACTIVE | Noted: 2022-11-10

## 2022-11-10 PROCEDURE — 92004 COMPRE OPH EXAM NEW PT 1/>: CPT | Performed by: OPTOMETRIST

## 2022-11-10 PROCEDURE — 92250 FUNDUS PHOTOGRAPHY W/I&R: CPT | Performed by: OPTOMETRIST

## 2022-11-10 ASSESSMENT — REFRACTION_MANIFEST
OD_CYLINDER: SPH
OD_ADD: +2.50
OS_VA1: 20/30
OS_ADD: +2.50
OS_CYLINDER: -0.50
OS_SPHERE: +2.00
OD_SPHERE: +2.00
OD_VA1: 20/30
OS_AXIS: 080

## 2022-11-10 ASSESSMENT — REFRACTION_CURRENTRX
OS_CYLINDER: -0.50
OS_OVR_VA: 20/
OD_CYLINDER: SPH
OS_SPHERE: +2.00
OS_VPRISM_DIRECTION: SV
OS_AXIS: 080
OD_OVR_VA: 20/
OD_VPRISM_DIRECTION: SV
OD_SPHERE: +1.75

## 2022-11-10 ASSESSMENT — CONFRONTATIONAL VISUAL FIELD TEST (CVF)
OS_FINDINGS: FULL
OD_FINDINGS: FULL

## 2022-11-10 ASSESSMENT — VISUAL ACUITY
OD_BCVA: 20/30
OS_BCVA: 20/30

## 2022-11-10 ASSESSMENT — REFRACTION_AUTOREFRACTION
OS_SPHERE: +2.75
OS_CYLINDER: -1.25
OD_AXIS: 090
OD_SPHERE: +2.50
OD_CYLINDER: -0.75
OS_AXIS: 085

## 2022-11-10 ASSESSMENT — KERATOMETRY
OS_K2POWER_DIOPTERS: 41.75
OD_K2POWER_DIOPTERS: 41.50
OS_K1POWER_DIOPTERS: 41.50
OD_AXISANGLE_DEGREES: 090
OD_K1POWER_DIOPTERS: 41.50
OS_AXISANGLE_DEGREES: 170

## 2022-11-10 ASSESSMENT — TONOMETRY
OS_IOP_MMHG: 17
OD_IOP_MMHG: 18

## 2022-11-10 ASSESSMENT — AXIALLENGTH_DERIVED
OS_AL: 23.4535
OD_AL: 23.499
OS_AL: 23.5988

## 2022-11-10 ASSESSMENT — SPHEQUIV_DERIVED
OD_SPHEQUIV: 2.125
OS_SPHEQUIV: 2.125
OS_SPHEQUIV: 1.75

## 2024-01-04 ENCOUNTER — OFFICE (OUTPATIENT)
Dept: URBAN - METROPOLITAN AREA CLINIC 94 | Facility: CLINIC | Age: 74
Setting detail: OPHTHALMOLOGY
End: 2024-01-04
Payer: MEDICARE

## 2024-01-04 DIAGNOSIS — H25.13: ICD-10-CM

## 2024-01-04 DIAGNOSIS — H35.033: ICD-10-CM

## 2024-01-04 PROCEDURE — 92014 COMPRE OPH EXAM EST PT 1/>: CPT | Performed by: OPTOMETRIST

## 2024-01-04 PROCEDURE — 92250 FUNDUS PHOTOGRAPHY W/I&R: CPT | Performed by: OPTOMETRIST

## 2024-01-04 ASSESSMENT — REFRACTION_MANIFEST
OD_VA1: 20/30
OS_CYLINDER: -0.50
OS_AXIS: 080
OS_VA1: 20/30
OS_AXIS: 080
OD_CYLINDER: SPH
OS_ADD: +2.50
OS_SPHERE: +2.00
OD_SPHERE: +2.00
OD_ADD: +2.50
OD_CYLINDER: SPH
OD_VA1: 20/30
OD_SPHERE: +2.00
OD_ADD: +2.50
OS_CYLINDER: -0.50
OS_VA1: 20/30
OS_SPHERE: +2.00
OS_ADD: +2.50

## 2024-01-04 ASSESSMENT — REFRACTION_CURRENTRX
OS_VPRISM_DIRECTION: SV
OS_CYLINDER: -0.75
OS_SPHERE: +2.00
OS_AXIS: 080
OD_SPHERE: +1.75
OD_OVR_VA: 20/
OS_CYLINDER: -0.50
OD_SPHERE: +1.75
OD_CYLINDER: PLANO
OD_VPRISM_DIRECTION: SV
OD_OVR_VA: 20/
OS_OVR_VA: 20/
OS_SPHERE: +2.25
OS_AXIS: 080
OD_CYLINDER: SPH
OS_OVR_VA: 20/

## 2024-01-04 ASSESSMENT — REFRACTION_AUTOREFRACTION
OD_CYLINDER: -0.75
OS_AXIS: 090
OS_CYLINDER: -1.25
OD_SPHERE: +2.50
OS_SPHERE: +2.75
OD_AXIS: 090

## 2024-01-04 ASSESSMENT — SPHEQUIV_DERIVED
OS_SPHEQUIV: 2.125
OD_SPHEQUIV: 2.125
OS_SPHEQUIV: 1.75
OS_SPHEQUIV: 1.75

## 2024-01-04 ASSESSMENT — CONFRONTATIONAL VISUAL FIELD TEST (CVF)
OS_FINDINGS: FULL
OD_FINDINGS: FULL

## 2024-01-24 ENCOUNTER — OFFICE (OUTPATIENT)
Dept: URBAN - METROPOLITAN AREA CLINIC 94 | Facility: CLINIC | Age: 74
Setting detail: OPHTHALMOLOGY
End: 2024-01-24
Payer: MEDICARE

## 2024-01-24 DIAGNOSIS — H25.13: ICD-10-CM

## 2024-01-24 DIAGNOSIS — H35.033: ICD-10-CM

## 2024-01-24 DIAGNOSIS — H25.11: ICD-10-CM

## 2024-01-24 PROCEDURE — 92136 OPHTHALMIC BIOMETRY: CPT | Mod: TC | Performed by: OPHTHALMOLOGY

## 2024-01-24 PROCEDURE — 99214 OFFICE O/P EST MOD 30 MIN: CPT | Performed by: OPHTHALMOLOGY

## 2024-01-24 PROCEDURE — 92136 OPHTHALMIC BIOMETRY: CPT | Mod: 26,RT | Performed by: OPHTHALMOLOGY

## 2024-01-24 ASSESSMENT — REFRACTION_CURRENTRX
OS_CYLINDER: -0.50
OD_SPHERE: +1.75
OD_VPRISM_DIRECTION: SV
OD_CYLINDER: PLANO
OD_CYLINDER: SPH
OS_CYLINDER: -0.75
OS_SPHERE: +2.25
OS_AXIS: 080
OD_OVR_VA: 20/
OS_OVR_VA: 20/
OS_VPRISM_DIRECTION: SV
OS_AXIS: 080
OD_OVR_VA: 20/
OD_SPHERE: +1.75
OS_OVR_VA: 20/
OS_SPHERE: +2.00

## 2024-01-24 ASSESSMENT — SPHEQUIV_DERIVED
OS_SPHEQUIV: 1.75
OS_SPHEQUIV: 2.125
OS_SPHEQUIV: 1.75
OS_SPHEQUIV: 2.125
OD_SPHEQUIV: 2.375
OD_SPHEQUIV: 2.375

## 2024-01-24 ASSESSMENT — REFRACTION_MANIFEST
OD_VA1: 20/30
OD_SPHERE: +2.00
OD_VA1: 20/30
OD_CYLINDER: SPH
OS_VA1: 20/30
OS_VA1: 20/30
OD_ADD: +2.50
OS_VA1: 20/30
OD_CYLINDER: SPH
OS_SPHERE: +2.75
OD_CYLINDER: -0.75
OD_AXIS: 091
OS_SPHERE: +2.00
OD_SPHERE: +2.00
OS_AXIS: 087
OS_CYLINDER: -1.25
OD_ADD: +2.50
OS_AXIS: 080
OS_ADD: +2.50
OS_CYLINDER: -0.50
OS_CYLINDER: -0.50
OD_SPHERE: +2.75
OS_AXIS: 080
OD_VA1: 20/25
OS_SPHERE: +2.00
OS_ADD: +2.50

## 2024-01-24 ASSESSMENT — REFRACTION_AUTOREFRACTION
OS_SPHERE: +2.75
OS_CYLINDER: -1.25
OD_SPHERE: +2.75
OS_AXIS: 087
OD_CYLINDER: -0.75
OD_AXIS: 091

## 2024-01-24 ASSESSMENT — CONFRONTATIONAL VISUAL FIELD TEST (CVF)
OS_FINDINGS: FULL
OD_FINDINGS: FULL

## 2024-01-25 PROBLEM — H25.13 CATARACT SENILE NUCLEAR SCLEROSIS; BOTH EYES: Status: ACTIVE | Noted: 2024-01-24

## 2024-02-29 PROBLEM — H25.12 CATARACT SENILE NUCLEAR SCLEROSIS;  , LEFT EYE: Status: ACTIVE | Noted: 2024-02-29

## 2024-02-29 PROBLEM — Z96.1 PSEUDOPHAKIA: Status: ACTIVE | Noted: 2024-02-29

## 2024-03-09 ENCOUNTER — RX ONLY (RX ONLY)
Age: 74
End: 2024-03-09

## 2024-03-09 ENCOUNTER — OFFICE (OUTPATIENT)
Dept: URBAN - METROPOLITAN AREA CLINIC 94 | Facility: CLINIC | Age: 74
Setting detail: OPHTHALMOLOGY
End: 2024-03-09
Payer: MEDICARE

## 2024-03-09 DIAGNOSIS — H25.12: ICD-10-CM

## 2024-03-09 DIAGNOSIS — Z96.1: ICD-10-CM

## 2024-03-09 PROCEDURE — 99024 POSTOP FOLLOW-UP VISIT: CPT | Performed by: PHYSICIAN ASSISTANT

## 2024-03-09 ASSESSMENT — REFRACTION_MANIFEST
OS_ADD: +2.50
OD_VA1: 20/30
OD_SPHERE: +2.00
OS_SPHERE: +2.00
OS_VA1: 20/30
OS_CYLINDER: -1.25
OD_CYLINDER: SPH
OS_CYLINDER: -0.50
OS_VA1: 20/30
OS_AXIS: 087
OD_CYLINDER: -0.75
OD_CYLINDER: SPH
OD_ADD: +2.50
OS_AXIS: 080
OS_SPHERE: +2.75
OS_SPHERE: +2.00
OS_VA1: 20/30
OD_AXIS: 091
OD_VA1: 20/30
OD_SPHERE: +2.75
OD_SPHERE: +2.00
OD_ADD: +2.50
OD_VA1: 20/25
OS_AXIS: 080
OS_ADD: +2.50
OS_CYLINDER: -0.50

## 2024-03-09 ASSESSMENT — SPHEQUIV_DERIVED
OD_SPHEQUIV: 2.375
OS_SPHEQUIV: 1.75
OS_SPHEQUIV: 1.75
OS_SPHEQUIV: 2.125

## 2024-03-09 ASSESSMENT — REFRACTION_CURRENTRX
OS_VPRISM_DIRECTION: SV
OD_CYLINDER: SPH
OD_VPRISM_DIRECTION: SV
OS_AXIS: 080
OS_CYLINDER: -0.75
OS_SPHERE: +2.00
OS_SPHERE: +2.25
OS_AXIS: 080
OD_SPHERE: +1.75
OS_CYLINDER: -0.50
OS_OVR_VA: 20/
OS_OVR_VA: 20/
OD_OVR_VA: 20/
OD_OVR_VA: 20/
OD_CYLINDER: PLANO
OD_SPHERE: +1.75

## 2024-03-15 ENCOUNTER — ASC (OUTPATIENT)
Dept: URBAN - METROPOLITAN AREA SURGERY 8 | Facility: SURGERY | Age: 74
Setting detail: OPHTHALMOLOGY
End: 2024-03-15
Payer: MEDICARE

## 2024-03-15 DIAGNOSIS — H25.12: ICD-10-CM

## 2024-03-15 PROCEDURE — 66984 XCAPSL CTRC RMVL W/O ECP: CPT | Mod: 79,LT | Performed by: OPHTHALMOLOGY

## 2024-03-16 ENCOUNTER — OFFICE (OUTPATIENT)
Dept: URBAN - METROPOLITAN AREA CLINIC 94 | Facility: CLINIC | Age: 74
Setting detail: OPHTHALMOLOGY
End: 2024-03-16
Payer: MEDICARE

## 2024-03-16 ENCOUNTER — RX ONLY (RX ONLY)
Age: 74
End: 2024-03-16

## 2024-03-16 DIAGNOSIS — Z96.1: ICD-10-CM

## 2024-03-16 PROCEDURE — 99024 POSTOP FOLLOW-UP VISIT: CPT | Performed by: OPTOMETRIST

## 2024-03-16 ASSESSMENT — REFRACTION_MANIFEST
OD_VA1: 20/30
OS_SPHERE: +2.00
OD_ADD: +2.50
OS_AXIS: 087
OS_CYLINDER: -0.50
OS_VA1: 20/30
OD_SPHERE: +2.00
OS_VA1: 20/30
OD_VA1: 20/30
OD_SPHERE: +2.75
OS_VA1: 20/30
OD_SPHERE: +2.00
OD_CYLINDER: SPH
OS_CYLINDER: -1.25
OS_CYLINDER: -0.50
OS_SPHERE: +2.75
OD_ADD: +2.50
OD_CYLINDER: -0.75
OS_SPHERE: +2.00
OD_AXIS: 091
OS_ADD: +2.50
OD_VA1: 20/25
OS_ADD: +2.50
OS_AXIS: 080
OD_CYLINDER: SPH
OS_AXIS: 080

## 2024-03-16 ASSESSMENT — REFRACTION_CURRENTRX
OD_OVR_VA: 20/
OS_AXIS: 080
OS_CYLINDER: -0.75
OS_SPHERE: +2.00
OS_OVR_VA: 20/
OS_VPRISM_DIRECTION: SV
OS_CYLINDER: -0.50
OD_SPHERE: +1.75
OD_CYLINDER: PLANO
OS_SPHERE: +2.25
OD_OVR_VA: 20/
OS_AXIS: 080
OD_CYLINDER: SPH
OS_OVR_VA: 20/
OD_SPHERE: +1.75
OD_VPRISM_DIRECTION: SV

## 2024-03-16 ASSESSMENT — SPHEQUIV_DERIVED
OS_SPHEQUIV: 1.75
OD_SPHEQUIV: 2.375
OS_SPHEQUIV: 1.75
OS_SPHEQUIV: 2.125

## 2024-03-29 ENCOUNTER — OFFICE (OUTPATIENT)
Dept: URBAN - METROPOLITAN AREA CLINIC 94 | Facility: CLINIC | Age: 74
Setting detail: OPHTHALMOLOGY
End: 2024-03-29
Payer: MEDICARE

## 2024-03-29 DIAGNOSIS — Z96.1: ICD-10-CM

## 2024-03-29 PROCEDURE — 99024 POSTOP FOLLOW-UP VISIT: CPT | Performed by: PHYSICIAN ASSISTANT

## 2024-03-29 ASSESSMENT — REFRACTION_MANIFEST
OS_AXIS: 080
OD_SPHERE: +2.00
OS_VA1: 20/30
OD_SPHERE: +2.75
OD_ADD: +2.50
OS_VA1: 20/30
OS_ADD: +2.50
OD_VA1: 20/30
OD_ADD: +2.50
OD_VA1: 20/25
OS_VA1: 20/30
OD_CYLINDER: SPH
OS_CYLINDER: -1.25
OS_CYLINDER: -0.50
OD_CYLINDER: SPH
OS_SPHERE: +2.00
OS_AXIS: 080
OS_SPHERE: +2.75
OD_SPHERE: +2.00
OS_ADD: +2.50
OS_AXIS: 087
OS_CYLINDER: -0.50
OS_SPHERE: +2.00
OD_AXIS: 091
OD_VA1: 20/30
OD_CYLINDER: -0.75

## 2024-03-29 ASSESSMENT — REFRACTION_CURRENTRX
OS_SPHERE: +2.00
OD_OVR_VA: 20/
OS_SPHERE: +2.25
OS_VPRISM_DIRECTION: SV
OD_CYLINDER: PLANO
OD_SPHERE: +1.75
OD_SPHERE: +1.75
OS_OVR_VA: 20/
OS_CYLINDER: -0.50
OS_CYLINDER: -0.75
OD_VPRISM_DIRECTION: SV
OD_CYLINDER: SPH
OS_OVR_VA: 20/
OD_OVR_VA: 20/
OS_AXIS: 080
OS_AXIS: 080

## 2024-03-29 ASSESSMENT — SPHEQUIV_DERIVED
OS_SPHEQUIV: 1.75
OS_SPHEQUIV: 1.75
OS_SPHEQUIV: 2.125
OD_SPHEQUIV: 2.375

## 2024-05-02 ENCOUNTER — OFFICE (OUTPATIENT)
Dept: URBAN - METROPOLITAN AREA CLINIC 94 | Facility: CLINIC | Age: 74
Setting detail: OPHTHALMOLOGY
End: 2024-05-02
Payer: MEDICARE

## 2024-05-02 DIAGNOSIS — H52.13: ICD-10-CM

## 2024-05-02 PROCEDURE — 99024 POSTOP FOLLOW-UP VISIT: CPT | Performed by: OPTOMETRIST

## 2024-05-02 ASSESSMENT — CONFRONTATIONAL VISUAL FIELD TEST (CVF)
OS_FINDINGS: FULL
OD_FINDINGS: FULL

## 2024-09-03 ENCOUNTER — EMERGENCY (EMERGENCY)
Facility: HOSPITAL | Age: 74
LOS: 1 days | Discharge: HOME | End: 2024-09-03
Attending: STUDENT IN AN ORGANIZED HEALTH CARE EDUCATION/TRAINING PROGRAM
Payer: MEDICARE

## 2024-09-03 VITALS
SYSTOLIC BLOOD PRESSURE: 138 MMHG | HEART RATE: 60 BPM | TEMPERATURE: 98 F | DIASTOLIC BLOOD PRESSURE: 76 MMHG | OXYGEN SATURATION: 100 % | RESPIRATION RATE: 16 BRPM

## 2024-09-03 VITALS
TEMPERATURE: 98 F | HEART RATE: 60 BPM | WEIGHT: 174.17 LBS | RESPIRATION RATE: 20 BRPM | DIASTOLIC BLOOD PRESSURE: 82 MMHG | OXYGEN SATURATION: 99 % | SYSTOLIC BLOOD PRESSURE: 174 MMHG

## 2024-09-03 DIAGNOSIS — Z78.9 OTHER SPECIFIED HEALTH STATUS: Chronic | ICD-10-CM

## 2024-09-03 DIAGNOSIS — Z98.890 OTHER SPECIFIED POSTPROCEDURAL STATES: Chronic | ICD-10-CM

## 2024-09-03 LAB
ALBUMIN SERPL ELPH-MCNC: 4.1 G/DL — SIGNIFICANT CHANGE UP (ref 3.3–5.2)
ALP SERPL-CCNC: 91 U/L — SIGNIFICANT CHANGE UP (ref 40–120)
ALT FLD-CCNC: 14 U/L — SIGNIFICANT CHANGE UP
ANION GAP SERPL CALC-SCNC: 10 MMOL/L — SIGNIFICANT CHANGE UP (ref 5–17)
APTT BLD: 29.2 SEC — SIGNIFICANT CHANGE UP (ref 24.5–35.6)
AST SERPL-CCNC: 22 U/L — SIGNIFICANT CHANGE UP
BASOPHILS # BLD AUTO: 0.05 K/UL — SIGNIFICANT CHANGE UP (ref 0–0.2)
BASOPHILS NFR BLD AUTO: 0.5 % — SIGNIFICANT CHANGE UP (ref 0–2)
BILIRUB SERPL-MCNC: 1.1 MG/DL — SIGNIFICANT CHANGE UP (ref 0.4–2)
BUN SERPL-MCNC: 13.9 MG/DL — SIGNIFICANT CHANGE UP (ref 8–20)
CALCIUM SERPL-MCNC: 9 MG/DL — SIGNIFICANT CHANGE UP (ref 8.4–10.5)
CHLORIDE SERPL-SCNC: 105 MMOL/L — SIGNIFICANT CHANGE UP (ref 96–108)
CO2 SERPL-SCNC: 25 MMOL/L — SIGNIFICANT CHANGE UP (ref 22–29)
CREAT SERPL-MCNC: 0.84 MG/DL — SIGNIFICANT CHANGE UP (ref 0.5–1.3)
EGFR: 93 ML/MIN/1.73M2 — SIGNIFICANT CHANGE UP
EOSINOPHIL # BLD AUTO: 0.1 K/UL — SIGNIFICANT CHANGE UP (ref 0–0.5)
EOSINOPHIL NFR BLD AUTO: 1.1 % — SIGNIFICANT CHANGE UP (ref 0–6)
GLUCOSE SERPL-MCNC: 112 MG/DL — HIGH (ref 70–99)
HCT VFR BLD CALC: 43.2 % — SIGNIFICANT CHANGE UP (ref 39–50)
HGB BLD-MCNC: 15 G/DL — SIGNIFICANT CHANGE UP (ref 13–17)
IMM GRANULOCYTES NFR BLD AUTO: 0.3 % — SIGNIFICANT CHANGE UP (ref 0–0.9)
INR BLD: 1.23 RATIO — HIGH (ref 0.85–1.18)
LIDOCAIN IGE QN: 33 U/L — SIGNIFICANT CHANGE UP (ref 22–51)
LYMPHOCYTES # BLD AUTO: 0.93 K/UL — LOW (ref 1–3.3)
LYMPHOCYTES # BLD AUTO: 10.1 % — LOW (ref 13–44)
MAGNESIUM SERPL-MCNC: 2 MG/DL — SIGNIFICANT CHANGE UP (ref 1.6–2.6)
MCHC RBC-ENTMCNC: 31.2 PG — SIGNIFICANT CHANGE UP (ref 27–34)
MCHC RBC-ENTMCNC: 34.7 GM/DL — SIGNIFICANT CHANGE UP (ref 32–36)
MCV RBC AUTO: 89.8 FL — SIGNIFICANT CHANGE UP (ref 80–100)
MONOCYTES # BLD AUTO: 0.47 K/UL — SIGNIFICANT CHANGE UP (ref 0–0.9)
MONOCYTES NFR BLD AUTO: 5.1 % — SIGNIFICANT CHANGE UP (ref 2–14)
NEUTROPHILS # BLD AUTO: 7.67 K/UL — HIGH (ref 1.8–7.4)
NEUTROPHILS NFR BLD AUTO: 82.9 % — HIGH (ref 43–77)
PLATELET # BLD AUTO: 103 K/UL — LOW (ref 150–400)
POTASSIUM SERPL-MCNC: 4 MMOL/L — SIGNIFICANT CHANGE UP (ref 3.5–5.3)
POTASSIUM SERPL-SCNC: 4 MMOL/L — SIGNIFICANT CHANGE UP (ref 3.5–5.3)
PROT SERPL-MCNC: 7.2 G/DL — SIGNIFICANT CHANGE UP (ref 6.6–8.7)
PROTHROM AB SERPL-ACNC: 13.6 SEC — HIGH (ref 9.5–13)
RBC # BLD: 4.81 M/UL — SIGNIFICANT CHANGE UP (ref 4.2–5.8)
RBC # FLD: 12.4 % — SIGNIFICANT CHANGE UP (ref 10.3–14.5)
SODIUM SERPL-SCNC: 140 MMOL/L — SIGNIFICANT CHANGE UP (ref 135–145)
TROPONIN T, HIGH SENSITIVITY RESULT: <6 NG/L — SIGNIFICANT CHANGE UP (ref 0–51)
WBC # BLD: 9.25 K/UL — SIGNIFICANT CHANGE UP (ref 3.8–10.5)
WBC # FLD AUTO: 9.25 K/UL — SIGNIFICANT CHANGE UP (ref 3.8–10.5)

## 2024-09-03 PROCEDURE — 83690 ASSAY OF LIPASE: CPT

## 2024-09-03 PROCEDURE — 80053 COMPREHEN METABOLIC PANEL: CPT

## 2024-09-03 PROCEDURE — 84484 ASSAY OF TROPONIN QUANT: CPT

## 2024-09-03 PROCEDURE — 71046 X-RAY EXAM CHEST 2 VIEWS: CPT | Mod: 26

## 2024-09-03 PROCEDURE — 71275 CT ANGIOGRAPHY CHEST: CPT | Mod: MC

## 2024-09-03 PROCEDURE — 96375 TX/PRO/DX INJ NEW DRUG ADDON: CPT | Mod: XU

## 2024-09-03 PROCEDURE — 83735 ASSAY OF MAGNESIUM: CPT

## 2024-09-03 PROCEDURE — 85730 THROMBOPLASTIN TIME PARTIAL: CPT

## 2024-09-03 PROCEDURE — 76705 ECHO EXAM OF ABDOMEN: CPT

## 2024-09-03 PROCEDURE — 74174 CTA ABD&PLVS W/CONTRAST: CPT | Mod: 26,MC

## 2024-09-03 PROCEDURE — 85610 PROTHROMBIN TIME: CPT

## 2024-09-03 PROCEDURE — 36415 COLL VENOUS BLD VENIPUNCTURE: CPT

## 2024-09-03 PROCEDURE — 99285 EMERGENCY DEPT VISIT HI MDM: CPT

## 2024-09-03 PROCEDURE — 99285 EMERGENCY DEPT VISIT HI MDM: CPT | Mod: 25

## 2024-09-03 PROCEDURE — 76705 ECHO EXAM OF ABDOMEN: CPT | Mod: 26

## 2024-09-03 PROCEDURE — 74174 CTA ABD&PLVS W/CONTRAST: CPT | Mod: MC

## 2024-09-03 PROCEDURE — 93010 ELECTROCARDIOGRAM REPORT: CPT

## 2024-09-03 PROCEDURE — 71275 CT ANGIOGRAPHY CHEST: CPT | Mod: 26,MC

## 2024-09-03 PROCEDURE — 71046 X-RAY EXAM CHEST 2 VIEWS: CPT

## 2024-09-03 PROCEDURE — 93005 ELECTROCARDIOGRAM TRACING: CPT

## 2024-09-03 PROCEDURE — 85025 COMPLETE CBC W/AUTO DIFF WBC: CPT

## 2024-09-03 PROCEDURE — 96374 THER/PROPH/DIAG INJ IV PUSH: CPT | Mod: XU

## 2024-09-03 PROCEDURE — 99053 MED SERV 10PM-8AM 24 HR FAC: CPT

## 2024-09-03 RX ORDER — ONDANSETRON 2 MG/ML
4 INJECTION, SOLUTION INTRAMUSCULAR; INTRAVENOUS ONCE
Refills: 0 | Status: COMPLETED | OUTPATIENT
Start: 2024-09-03 | End: 2024-09-03

## 2024-09-03 RX ORDER — METOPROLOL TARTRATE 100 MG/1
25 TABLET ORAL ONCE
Refills: 0 | Status: COMPLETED | OUTPATIENT
Start: 2024-09-03 | End: 2024-09-03

## 2024-09-03 RX ADMIN — ONDANSETRON 4 MILLIGRAM(S): 2 INJECTION, SOLUTION INTRAMUSCULAR; INTRAVENOUS at 07:52

## 2024-09-03 RX ADMIN — METOPROLOL TARTRATE 25 MILLIGRAM(S): 100 TABLET ORAL at 07:52

## 2024-09-03 RX ADMIN — Medication 4 MILLIGRAM(S): at 07:52

## 2024-09-03 NOTE — ED PROVIDER NOTE - CARE PROVIDERS DIRECT ADDRESSES
[email or web address]

## 2024-09-03 NOTE — ED PROVIDER NOTE - NSICDXPASTSURGICALHX_GEN_ALL_CORE_FT
PAST SURGICAL HISTORY:  No pertinent past surgical history     S/P ablation of atrial fibrillation

## 2024-09-03 NOTE — CONSULT NOTE ADULT - SUBJECTIVE AND OBJECTIVE BOX
HPI: " 73y man w/PMH of afib (s/p ablation on Xarelto) presenting to the ED with an acute onset of abdominal pain. Patient was in his usual health until this morning at 2AM, when he woke up due to abdominal pain. Patient locates the pain as epigastric and states that it is a constant 8/10 pain that's non radiating. Patient had one episode of vomiting this morning and has had a normal BM last night. Patient reports having chronic GERD, but has not been taking any medication as of recently. Patient denies chest pain, SOB, diarrhea, constipation. "     ROS: 10-system review is otherwise negative except HPI above.      PAST MEDICAL & SURGICAL HISTORY:  Chronic atrial fibrillation      No pertinent past surgical history      S/P ablation of atrial fibrillation        FAMILY HISTORY:  No pertinent family history      Family history not pertinent as reviewed with the patient.    SOCIAL HISTORY:  Denies any toxic habits    ALLERGIES: NKA No Known Allergies      Home Medications:      --------------------------------------------------------------------------------------------    PHYSICAL EXAM:   General: NAD, Lying in bed comfortably  Neuro: A+Ox3  HEENT: EOAUGUSTINE MARTINEZ, ERNESTO  Cardio: RRR  Resp: Non labored breathing on RA  GI/Abd: Soft, NT/ND    --------------------------------------------------------------------------------------------    LABS                 15.0   9.25   )----------(  103       ( 03 Sep 2024 07:00 )               43.2      140    |  105    |  13.9   ----------------------------<  112        ( 03 Sep 2024 07:00 )  4.0     |  25.0   |  0.84     Ca    9.0        ( 03 Sep 2024 07:00 )  Mg     2.0       ( 03 Sep 2024 07:00 )    TPro  7.2    /  Alb  4.1    /  TBili  1.1    /  DBili  x      /  AST  22     /  ALT  14     /  AlkPhos  91     ( 03 Sep 2024 07:00 )    LIVER FUNCTIONS - ( 03 Sep 2024 07:00 )  Alb: 4.1 g/dL / Pro: 7.2 g/dL / ALK PHOS: 91 U/L / ALT: 14 U/L / AST: 22 U/L / GGT: x           PT/INR -  13.6 sec / 1.23 ratio   ( 03 Sep 2024 07:00 )       PTT -  29.2 sec   ( 03 Sep 2024 07:00 )  CAPILLARY BLOOD GLUCOSE        Urinalysis Basic - ( 03 Sep 2024 07:00 )    Color: x / Appearance: x / SG: x / pH: x  Gluc: 112 mg/dL / Ketone: x  / Bili: x / Urobili: x   Blood: x / Protein: x / Nitrite: x   Leuk Esterase: x / RBC: x / WBC x   Sq Epi: x / Non Sq Epi: x / Bacteria: x          --------------------------------------------------------------------------------------------

## 2024-09-03 NOTE — ED ADULT NURSE NOTE - RELATIONSHIP TO PATIENT
Potassium was down to 6 but is still too high. Do not take any more potassium pills. He was advised to seek treatment in the ER for acute kidney injury. He refused to go at that time. 
Daughter

## 2024-09-03 NOTE — ED PROVIDER NOTE - CARE PROVIDER_API CALL
Anil Pelaez  Surgery  [address]  [place] [address]  Phone: [phone]  Fax: [phone]  Follow Up Time: Urgent    Divya Augustine  Transplant Hepatology  [address] [facility], [address]  [place] [address]  Phone: [phone]  Fax: [phone]  Follow Up Time: Routine

## 2024-09-03 NOTE — ED PROVIDER NOTE - PROGRESS NOTE DETAILS
Patient was reassessed after surgery consultation and his symptoms have largely resolved. Denies any abdominal pain and nausea. Discussed with the patient and daughter that he will have to make an outpatient appointment with surgery.

## 2024-09-03 NOTE — ED PROVIDER NOTE - OBJECTIVE STATEMENT
Patient is a 73y man w/PMH of afib (s/p ablation on Xarelto) presenting to the ED with an acute onset of abdominal pain. Patient was in his usual health until this morning at 2AM, when he woke up due to abdominal pain. Patient locates the pain as epigastric and states that it is a constant 8/10 pain that's non radiating. Patient had one episode of vomiting this morning and has had a normal BM last night. Patient reports having chronic GERD, but has not been taking any medication as of recently. Patient denies chest pain, SOB, diarrhea, constipation.

## 2024-09-03 NOTE — ED PROVIDER NOTE - NSFOLLOWUPINSTRUCTIONS_ED_ALL_ED_FT
Please read the information below regarding cholecystitis. Please call the General Surgery office and make an appointment as soon as possible for further outpatient management. Please eat a low fat diet and come back to the ED if there is severe abdominal pain, nausea, vomiting, fever.    Ultrasound of the liver revealed a nodular morphology, which may be signs of liver disease. Please make an appointment with the hepatologist for further outpatient management.      Cholecystitis  Body outline showing the digestive system with a close-up of the gallbladder.   Cholecystitis is inflammation of the gallbladder. Cholecystitis is often called a gallbladder attack. The gallbladder is a pear-shaped organ that lies beneath the liver on the right side of the body. The gallbladder stores a fluid that helps the body digest fats (bile). If bile builds up in your gallbladder, your gallbladder becomes inflamed and can develop a serious infection.    This condition may occur suddenly. Cholecystitis is a serious condition and requires treatment.    What are the causes?  The most common cause of this condition is gallstones. Gallstones can block the tube (duct) that carries bile out of your gallbladder. This causes bile to build up.    Other causes include:  Damage to the gallbladder due to decreased blood flow.  Infection in the bile duct.  Scars, kinks, or adhesions in the bile duct.  Tumors in the liver, pancreas, or gallbladder.  What increases the risk?  You are more likely to develop this condition if:  You are female and between the ages of 55–62.  You take birth control pills or use estrogen.  You take certain medicines that increase your likelihood of developing gallstones.  You are obese.  You have a severe reaction to an infection (sepsis). The infection may be bacterial, fungal, parasitic, or viral.  You have been hospitalized due to trauma, such as a burn or critical illness.  You have not eaten or drank for a long period of time (prolonged fasting).  What are the signs or symptoms?  Symptoms of this condition include:  Tenderness in the upper right part of the abdomen.  A lump over the gallbladder.  Bloating in the abdomen.  Nausea.  Vomiting.  Fever.  Chills.  How is this diagnosed?  A person having blood drawn from an arm.  This condition is diagnosed with a medical history and physical exam. You may also have other tests, including:  Imaging tests, such as:  An ultrasound of the abdomen.  A CT scan of the abdomen.  A gallbladder nuclear scan (HIDA cholescintigraphy). This allows your health care provider to see the bile moving from your liver to your gallbladder and to your small intestine.  MRI.  Blood tests, such as:  A complete blood count. The white blood cell count may be higher than normal.  C-reactive protein (CRP) test. The level of CRP will be higher if there is an infection.  Liver function tests. Certain types of gallstones cause some results to be higher than normal.  How is this treated?  Treatment may include:  Pain medicine and IV fluids.  Not eating or drinking (fasting). This helps to take stress off of your gallbladder.  Antibiotic medicine. This is usually given through an IV.  Surgery to remove your gallbladder (cholecystectomy).  Gallbladder drainage. In this procedure, a tube is placed into the gallbladder to drain fluid. This may be done for people with moderate to severe cholecystitis who cannot have surgery.  Follow these instructions at home:  Medicines    A prescription pill bottle with an example of a pill.  Take over-the-counter and prescription medicines only as told by your health care provider.  If you were prescribed an antibiotic medicine, take it as told by your health care provider. Do not stop taking the antibiotic even if you start to feel better.  General instructions    Follow instructions from your health care provider about what to eat or drink. When you are allowed to eat, avoid eating or drinking anything that triggers your symptoms.  Do not use any products that contain nicotine or tobacco. These products include cigarettes, chewing tobacco, and vaping devices, such as e-cigarettes. If you need help quitting, ask your health care provider.  Keep all follow-up visits. This is important.  Contact a health care provider if:  Your pain is not controlled with medicine.  You have a fever.  Get help right away if:  Your pain moves to another part of your abdomen or to your back.  You continue to have symptoms or you develop new symptoms even with treatment.  These symptoms may be an emergency. Get help right away. Call 911.  Do not wait to see if the symptoms will go away.  Do not drive yourself to the hospital.  Summary  Cholecystitis is inflammation of the gallbladder.  The most common cause of this condition is gallstones. Gallstones can block the tube (duct) that carries bile out of your gallbladder.  Common symptoms include tenderness in the abdomen, nausea, vomiting, fever, and chills.  This condition is treated with fasting, pain medicine, surgery to remove the gallbladder, antibiotic medicines, and gallbladder drainage.  Follow your health care provider's instructions for eating and drinking. Avoid eating anything that triggers your symptoms.  This information is not intended to replace advice given to you by your health care provider. Make sure you discuss any questions you have with your health care provider.

## 2024-09-03 NOTE — ED ADULT NURSE NOTE - OBJECTIVE STATEMENT
pt c/o chest pain/abdominal pain that started at 2am in his sleep. reports nausea and one small episode of vomiting. denies fever, chills, diarrhea, urinary sx. did not take anything for pain. hypertensive, hx htn did not take meds today

## 2024-09-03 NOTE — ED ADULT NURSE REASSESSMENT NOTE - NS ED NURSE REASSESS COMMENT FT1
pt returned from US. reports improvement of symptoms. pending results for further tx plan. daughter at bedside. updated on plan of care, verbalize understanding. call bell in reach

## 2024-09-03 NOTE — CONSULT NOTE ADULT - ASSESSMENT
ASSESSMENT: Patient is a 73y old male with symptomatic cholelithiasis     PLAN:    - no acute surgical intervention warranted at this time   - patient to follow up outpatient with Dr Celestin if passed PO challenge   - pain control   - Plan discussed with Attending, Dr. Celestin

## 2024-09-03 NOTE — ED ADULT TRIAGE NOTE - CHIEF COMPLAINT QUOTE
From home c/o Chest pain and abdominal pain with nausea, denies diarrhea started this morning, On blood thinner

## 2024-09-03 NOTE — ED PROVIDER NOTE - PROVIDER TOKENS
PROVIDER:[TOKEN:[[id number]],FOLLOWUP:[Urgent]],PROVIDER:[TOKEN:[[id number]],FOLLOWUP:[Routine]]

## 2024-09-03 NOTE — ED PROVIDER NOTE - PATIENT PORTAL LINK FT
You can access the FollowMyHealth Patient Portal offered by [facility] by registering at the following website: [email or web address]. By joining [employer]’s FollowMyHealth portal, you will also be able to view your health information using other applications (apps) compatible with our system.

## 2024-09-05 ENCOUNTER — APPOINTMENT (OUTPATIENT)
Dept: SURGERY | Facility: CLINIC | Age: 74
End: 2024-09-05
Payer: MEDICARE

## 2024-09-05 VITALS
DIASTOLIC BLOOD PRESSURE: 81 MMHG | SYSTOLIC BLOOD PRESSURE: 121 MMHG | RESPIRATION RATE: 16 BRPM | TEMPERATURE: 98 F | HEIGHT: 68.5 IN | OXYGEN SATURATION: 98 % | BODY MASS INDEX: 26.97 KG/M2 | HEART RATE: 62 BPM | WEIGHT: 180 LBS

## 2024-09-05 DIAGNOSIS — K74.60 UNSPECIFIED CIRRHOSIS OF LIVER: ICD-10-CM

## 2024-09-05 DIAGNOSIS — K80.20 CALCULUS OF GALLBLADDER W/OUT CHOLECYSTITIS W/OUT OBSTRUCTION: ICD-10-CM

## 2024-09-05 DIAGNOSIS — Z78.9 OTHER SPECIFIED HEALTH STATUS: ICD-10-CM

## 2024-09-05 PROCEDURE — 99204 OFFICE O/P NEW MOD 45 MIN: CPT

## 2024-09-05 RX ORDER — SERTRALINE 25 MG/1
25 TABLET, FILM COATED ORAL
Refills: 0 | Status: ACTIVE | COMMUNITY

## 2024-09-05 RX ORDER — RIVAROXABAN 10 MG/1
10 TABLET, FILM COATED ORAL
Refills: 0 | Status: ACTIVE | COMMUNITY

## 2024-09-05 RX ORDER — METOPROLOL TARTRATE 25 MG/1
25 TABLET, FILM COATED ORAL
Refills: 0 | Status: ACTIVE | COMMUNITY

## 2024-09-05 NOTE — ASSESSMENT
[FreeTextEntry1] : Mr. VALENTINE is a 73 year old man with symptomatic cholelithiasis. We discussed the options of minimally invasive cholecystectomy and nonoperative management. The risks/benefits and alternatives were discussed at length and all questions were answered. The patient appears to understand and wishes to proceed with robotic cholecystectomy.  Mr. VALENTINE has known history of liver disease and has upcoming appointment with Dr Augustine, hepatology. We discussed the risks/benefits of performing liver biopsy during cholecystectomy procedure. The risks/benefits and alternatives were discussed at length and all questions were answered. The patient appears to understand and wishes to proceed with liver biopsy during cholecystectomy procedure.

## 2024-09-05 NOTE — HISTORY OF PRESENT ILLNESS
[de-identified] : Mr. VALENTINE is a 73 year old man with right upper quadrant pain, seen in [facility] ED, who presents today for evaluation. He reports multiple episodes of similar pain, which have each resolved spontaneously. Denies pain radiating to back. Pain is associated with meals. Today, the patient denies pain. Denies fever/chills/nausea/emesis, denies changes in bowel or bladder habits. Tolerating diet, reports normal bowel movements.   Patient reports history of liver cancer, treated, and hepatitis. He has hepatology appointment with Dr Augustine scheduled for 10/7/24.    Right upper quadrant ultrasound: gallbladder stones and sludge, nodular appearing liver

## 2024-09-05 NOTE — CONSULT LETTER
[Dear  ___] : Dear  [unfilled], [Courtesy Letter:] : I had the pleasure of seeing your patient, [unfilled], in my office today. [Please see my note below.] : Please see my note below. [Consult Closing:] : Thank you very much for allowing me to participate in the care of this patient.  If you have any questions, please do not hesitate to contact me. [Sincerely,] : Sincerely, [FreeTextEntry3] : Noam Ardon MD, FACS Director of Bariatric Surgery [facility]  Assistant Professor of Surgery  [employer] [facility] [place]

## 2024-09-06 ENCOUNTER — OFFICE (OUTPATIENT)
Dept: URBAN - METROPOLITAN AREA CLINIC 94 | Facility: CLINIC | Age: 74
Setting detail: OPHTHALMOLOGY
End: 2024-09-06
Payer: MEDICARE

## 2024-09-06 DIAGNOSIS — H35.033: ICD-10-CM

## 2024-09-06 PROCEDURE — 92012 INTRM OPH EXAM EST PATIENT: CPT | Performed by: OPHTHALMOLOGY

## 2024-09-06 ASSESSMENT — CONFRONTATIONAL VISUAL FIELD TEST (CVF)
OD_FINDINGS: FULL
OS_FINDINGS: FULL

## 2024-09-09 PROBLEM — I48.20 CHRONIC ATRIAL FIBRILLATION, UNSPECIFIED: Chronic | Status: ACTIVE | Noted: 2024-09-03

## 2024-09-17 ENCOUNTER — TRANSCRIPTION ENCOUNTER (OUTPATIENT)
Age: 74
End: 2024-09-17

## 2024-09-19 ENCOUNTER — OUTPATIENT (OUTPATIENT)
Dept: OUTPATIENT SERVICES | Facility: HOSPITAL | Age: 74
LOS: 1 days | End: 2024-09-19
Payer: COMMERCIAL

## 2024-09-19 VITALS
TEMPERATURE: 97 F | DIASTOLIC BLOOD PRESSURE: 62 MMHG | HEART RATE: 69 BPM | SYSTOLIC BLOOD PRESSURE: 108 MMHG | WEIGHT: 181.88 LBS | HEIGHT: 69 IN | OXYGEN SATURATION: 98 % | RESPIRATION RATE: 18 BRPM

## 2024-09-19 DIAGNOSIS — Z01.818 ENCOUNTER FOR OTHER PREPROCEDURAL EXAMINATION: ICD-10-CM

## 2024-09-19 DIAGNOSIS — K74.60 UNSPECIFIED CIRRHOSIS OF LIVER: ICD-10-CM

## 2024-09-19 DIAGNOSIS — Z98.49 CATARACT EXTRACTION STATUS, UNSPECIFIED EYE: Chronic | ICD-10-CM

## 2024-09-19 DIAGNOSIS — I48.20 CHRONIC ATRIAL FIBRILLATION, UNSPECIFIED: ICD-10-CM

## 2024-09-19 DIAGNOSIS — Z29.9 ENCOUNTER FOR PROPHYLACTIC MEASURES, UNSPECIFIED: ICD-10-CM

## 2024-09-19 DIAGNOSIS — Z78.9 OTHER SPECIFIED HEALTH STATUS: ICD-10-CM

## 2024-09-19 DIAGNOSIS — K80.20 CALCULUS OF GALLBLADDER WITHOUT CHOLECYSTITIS WITHOUT OBSTRUCTION: ICD-10-CM

## 2024-09-19 DIAGNOSIS — Z78.9 OTHER SPECIFIED HEALTH STATUS: Chronic | ICD-10-CM

## 2024-09-19 DIAGNOSIS — Z13.89 ENCOUNTER FOR SCREENING FOR OTHER DISORDER: ICD-10-CM

## 2024-09-19 DIAGNOSIS — Z91.89 OTHER SPECIFIED PERSONAL RISK FACTORS, NOT ELSEWHERE CLASSIFIED: ICD-10-CM

## 2024-09-19 DIAGNOSIS — Z98.890 OTHER SPECIFIED POSTPROCEDURAL STATES: Chronic | ICD-10-CM

## 2024-09-19 LAB
A1C WITH ESTIMATED AVERAGE GLUCOSE RESULT: 4.9 % — SIGNIFICANT CHANGE UP (ref 4–5.6)
ALBUMIN SERPL ELPH-MCNC: 3.7 G/DL — SIGNIFICANT CHANGE UP (ref 3.3–5.2)
ALP SERPL-CCNC: 93 U/L — SIGNIFICANT CHANGE UP (ref 40–120)
ALT FLD-CCNC: 12 U/L — SIGNIFICANT CHANGE UP
AMYLASE P1 CFR SERPL: 91 U/L — SIGNIFICANT CHANGE UP (ref 36–128)
ANION GAP SERPL CALC-SCNC: 8 MMOL/L — SIGNIFICANT CHANGE UP (ref 5–17)
APTT BLD: 28.9 SEC — SIGNIFICANT CHANGE UP (ref 24.5–35.6)
AST SERPL-CCNC: 21 U/L — SIGNIFICANT CHANGE UP
BASOPHILS # BLD AUTO: 0.05 K/UL — SIGNIFICANT CHANGE UP (ref 0–0.2)
BASOPHILS NFR BLD AUTO: 0.7 % — SIGNIFICANT CHANGE UP (ref 0–2)
BILIRUB SERPL-MCNC: 0.7 MG/DL — SIGNIFICANT CHANGE UP (ref 0.4–2)
BLD GP AB SCN SERPL QL: SIGNIFICANT CHANGE UP
BUN SERPL-MCNC: 12.5 MG/DL — SIGNIFICANT CHANGE UP (ref 8–20)
CALCIUM SERPL-MCNC: 8.9 MG/DL — SIGNIFICANT CHANGE UP (ref 8.4–10.5)
CHLORIDE SERPL-SCNC: 102 MMOL/L — SIGNIFICANT CHANGE UP (ref 96–108)
CO2 SERPL-SCNC: 26 MMOL/L — SIGNIFICANT CHANGE UP (ref 22–29)
CREAT SERPL-MCNC: 0.95 MG/DL — SIGNIFICANT CHANGE UP (ref 0.5–1.3)
EGFR: 85 ML/MIN/1.73M2 — SIGNIFICANT CHANGE UP
EOSINOPHIL # BLD AUTO: 0.15 K/UL — SIGNIFICANT CHANGE UP (ref 0–0.5)
EOSINOPHIL NFR BLD AUTO: 2.1 % — SIGNIFICANT CHANGE UP (ref 0–6)
ESTIMATED AVERAGE GLUCOSE: 94 MG/DL — SIGNIFICANT CHANGE UP (ref 68–114)
GLUCOSE SERPL-MCNC: 89 MG/DL — SIGNIFICANT CHANGE UP (ref 70–99)
HCT VFR BLD CALC: 39.9 % — SIGNIFICANT CHANGE UP (ref 39–50)
HGB BLD-MCNC: 13.3 G/DL — SIGNIFICANT CHANGE UP (ref 13–17)
IMM GRANULOCYTES NFR BLD AUTO: 0.3 % — SIGNIFICANT CHANGE UP (ref 0–0.9)
INR BLD: 1.33 RATIO — HIGH (ref 0.85–1.18)
LIDOCAIN IGE QN: 38 U/L — SIGNIFICANT CHANGE UP (ref 22–51)
LYMPHOCYTES # BLD AUTO: 1.24 K/UL — SIGNIFICANT CHANGE UP (ref 1–3.3)
LYMPHOCYTES # BLD AUTO: 17.4 % — SIGNIFICANT CHANGE UP (ref 13–44)
MCHC RBC-ENTMCNC: 30 PG — SIGNIFICANT CHANGE UP (ref 27–34)
MCHC RBC-ENTMCNC: 33.3 GM/DL — SIGNIFICANT CHANGE UP (ref 32–36)
MCV RBC AUTO: 89.9 FL — SIGNIFICANT CHANGE UP (ref 80–100)
MONOCYTES # BLD AUTO: 0.7 K/UL — SIGNIFICANT CHANGE UP (ref 0–0.9)
MONOCYTES NFR BLD AUTO: 9.8 % — SIGNIFICANT CHANGE UP (ref 2–14)
NEUTROPHILS # BLD AUTO: 4.95 K/UL — SIGNIFICANT CHANGE UP (ref 1.8–7.4)
NEUTROPHILS NFR BLD AUTO: 69.7 % — SIGNIFICANT CHANGE UP (ref 43–77)
PLATELET # BLD AUTO: 116 K/UL — LOW (ref 150–400)
POTASSIUM SERPL-MCNC: 4.4 MMOL/L — SIGNIFICANT CHANGE UP (ref 3.5–5.3)
POTASSIUM SERPL-SCNC: 4.4 MMOL/L — SIGNIFICANT CHANGE UP (ref 3.5–5.3)
PROT SERPL-MCNC: 6.6 G/DL — SIGNIFICANT CHANGE UP (ref 6.6–8.7)
PROTHROM AB SERPL-ACNC: 14.6 SEC — HIGH (ref 9.5–13)
RBC # BLD: 4.44 M/UL — SIGNIFICANT CHANGE UP (ref 4.2–5.8)
RBC # FLD: 12.7 % — SIGNIFICANT CHANGE UP (ref 10.3–14.5)
SODIUM SERPL-SCNC: 136 MMOL/L — SIGNIFICANT CHANGE UP (ref 135–145)
WBC # BLD: 7.11 K/UL — SIGNIFICANT CHANGE UP (ref 3.8–10.5)
WBC # FLD AUTO: 7.11 K/UL — SIGNIFICANT CHANGE UP (ref 3.8–10.5)

## 2024-09-19 PROCEDURE — 80053 COMPREHEN METABOLIC PANEL: CPT

## 2024-09-19 PROCEDURE — 83036 HEMOGLOBIN GLYCOSYLATED A1C: CPT

## 2024-09-19 PROCEDURE — 86900 BLOOD TYPING SEROLOGIC ABO: CPT

## 2024-09-19 PROCEDURE — T1013: CPT

## 2024-09-19 PROCEDURE — 85025 COMPLETE CBC W/AUTO DIFF WBC: CPT

## 2024-09-19 PROCEDURE — 86901 BLOOD TYPING SEROLOGIC RH(D): CPT

## 2024-09-19 PROCEDURE — 85610 PROTHROMBIN TIME: CPT

## 2024-09-19 PROCEDURE — 36415 COLL VENOUS BLD VENIPUNCTURE: CPT

## 2024-09-19 PROCEDURE — 83690 ASSAY OF LIPASE: CPT

## 2024-09-19 PROCEDURE — 85730 THROMBOPLASTIN TIME PARTIAL: CPT

## 2024-09-19 PROCEDURE — 82150 ASSAY OF AMYLASE: CPT

## 2024-09-19 PROCEDURE — G0463: CPT

## 2024-09-19 PROCEDURE — 86850 RBC ANTIBODY SCREEN: CPT

## 2024-09-19 NOTE — H&P PST ADULT - HISTORY OF PRESENT ILLNESS
Patient is a 73 year old  male presenting today for PST, PMH includes atrial fibrillation on Xarelto s/p ablation, hepatitis and liver cancer  Patient c/o right upper quadrant pain for   He presented to [facility] ER  He reports multiple episodes of similar pain over the last  that have resolved spontaneously.    Denies pain radiating to back. Pain is associated with meals. Today, the patient denies pain. Denies fever/chills/nausea/emesis, denies changes in bowel or bladder habits. Tolerating diet, reports normal bowel movements.    Patient reports history of liver cancer, treated, and hepatitis. He has hepatology appointment with Dr Augustine scheduled for 10/7/24.     Right upper quadrant ultrasound: gallbladder stones and sludge, nodular appearing liver.      Patient is a 73 year old  male presenting today for PST, PMH includes HTN, asthma (seasonal), atrial fibrillation on Xarelto s/p ablation, hepatitis, depression, anxiety and liver cancer treated  followed by Dr Simms c/o right upper quadrant pain   He presented to [facility] ER He reports multiple episodes of similar pain over the last  that have resolved spontaneously.    Denies pain radiating to back. Pain is associated with meals. Today, the patient denies pain. Denies fever/chills/nausea/emesis, denies changes in bowel or bladder habits. Tolerating diet, reports normal bowel movements.    Patient reports history of liver cancer, treated, and hepatitis. He has hepatology appointment with Dr Augustine scheduled for 10/7/24.     Right upper quadrant ultrasound: gallbladder stones and sludge, nodular appearing liver.      Patient is a 73 year old  male presenting today for PST, PMH includes HTN, asthma (seasonal), atrial fibrillation on Xarelto s/p ablation, hepatitis, thrombocytopenia, depression, anxiety and liver cancer treated  (followed by Dr Kris Augustine). Patient c/o right upper quadrant pain. He presented to [facility] ER due to pain, abdominal US performed, as per chart " gallbladder stones and sludge, nodular appearing liver."  He reports multiple episodes of similar pain in  the past that were associated with meals and resolved spontaneously.  He reports constipation, but not states he has h/o constipation. Today, the patient denies abdominal pain. Denies fever, chills, nausea, vomiting, bloating, or diarrhea. He is tolerating low fat diet with no issues. Reports occasional chest pain and palpitations. Denies active chest pain or palpitations today. He is now scheduled for robotic cholecystectomy and liver biopsy with Dr. Ardon on 10/8/24 pending medical and cardiac clearance.    Patient is a 73 year old  Dominican speaking male, accompanied by daughter Erika who assisted with history. He is presenting today for PST, PMH includes HTN, asthma (seasonal), atrial fibrillation on Xarelto s/p ablation, hepatitis, thrombocytopenia, depression, anxiety and liver cancer treated  (followed by Dr Kris Augustine). Patient c/o right upper quadrant pain. He presented to [facility] ER due to pain, abdominal US performed, as per chart " gallbladder stones and sludge, nodular appearing liver."  He reports multiple episodes of similar pain in  the past that were associated with meals and resolved spontaneously.  He reports constipation, but not states he has h/o constipation. Today, the patient denies abdominal pain. Denies fever, chills, nausea, vomiting, bloating, or diarrhea. He is tolerating low fat diet with no issues. Reports occasional chest pain and palpitations. Denies active chest pain or palpitations today. He is now scheduled for robotic cholecystectomy and liver biopsy with Dr. Ardon on 10/8/24 pending medical and cardiac clearance.

## 2024-09-19 NOTE — H&P PST ADULT - PROBLEM SELECTOR PLAN 3
EKG performed  h/o ablation  on Xarelto  PT/PTT/INR performed repeat ordered for day of procedure.  Patient and daughter instructed to speak with cardiologist regarding Xarelto for instructions, verbalized understanding.   Cardiac clearance pending

## 2024-09-19 NOTE — H&P PST ADULT - PROBLEM SELECTOR PLAN 1
Scheduled for robotic cholecystectomy and liver biopsy with Dr. Ardon on 10/8/24 pending medical and cardiac clearance.

## 2024-09-19 NOTE — H&P PST ADULT - ASSESSMENT
This is a           CAPRINI SCORE    AGE RELATED RISK FACTORS                                                             [ ] Age 41-60 years                                            (1 Point)  [ ] Age: 61-74 years                                           (2 Points)                 [ ] Age= 75 years                                                (3 Points)             DISEASE RELATED RISK FACTORS                                                       [ ] Edema in the lower extremities                 (1 Point)                     [ ] Varicose veins                                               (1 Point)                                 [ ] BMI > 25 Kg/m2                                            (1 Point)                                  [ ] Serious infection (ie PNA)                            (1 Point)                     [ ] Lung disease ( COPD, Emphysema)            (1 Point)                                                                          [ ] Acute myocardial infarction                         (1 Point)                  [ ] Congestive heart failure (in the previous month)  (1 Point)         [ ] Inflammatory bowel disease                            (1 Point)                  [ ] Central venous access, PICC or Port               (2 points)       (within the last month)                                                                [ ] Stroke (in the previous month)                        (5 Points)    [ ] Previous or present malignancy                       (2 points)                                                                                                                                                         HEMATOLOGY RELATED FACTORS                                                         [ ] Prior episodes of VTE                                     (3 Points)                     [ ] Positive family history for VTE                      (3 Points)                  [ ] Prothrombin 53150 A                                     (3 Points)                     [ ] Factor V Leiden                                                (3 Points)                        [ ] Lupus anticoagulants                                      (3 Points)                                                           [ ] Anticardiolipin antibodies                              (3 Points)                                                       [ ] High homocysteine in the blood                   (3 Points)                                             [ ] Other congenital or acquired thrombophilia      (3 Points)                                                [ ] Heparin induced thrombocytopenia                  (3 Points)                                        MOBILITY RELATED FACTORS  [ ] Bed rest                                                         (1 Point)  [ ] Plaster cast                                                    (2 points)  [ ] Bed bound for more than 72 hours           (2 Points)    GENDER SPECIFIC FACTORS  [ ] Pregnancy or had a baby within the last month   (1 Point)  [ ] Post-partum < 6 weeks                                   (1 Point)  [ ] Hormonal therapy  or oral contraception   (1 Point)  [ ] History of pregnancy complications              (1 point)  [ ] Unexplained or recurrent              (1 Point)    OTHER RISK FACTORS                                           (1 Point)  [ ] BMI >40, smoking, diabetes requiring insulin, chemotherapy  blood transfusions and length of surgery over 2 hours    SURGERY RELATED RISK FACTORS  [ ]  Section within the last month     (1 Point)  [ ] Minor surgery                                                  (1 Point)  [ ] Arthroscopic surgery                                       (2 Points)  [ ] Planned major surgery lasting more            (2 Points)      than 45 minutes     [ ] Elective hip or knee joint replacement       (5 points)       surgery                                                TRAUMA RELATED RISK FACTORS  [ ] Fracture of the hip, pelvis, or leg                       (5 Points)  [ ] Spinal cord injury resulting in paralysis             (5 points)       (in the previous month)    [ ] Paralysis  (less than 1 month)                             (5 Points)  [ ] Multiple Trauma within 1 month                        (5 Points)    Total Score [        ]    Caprini Score 0-2: Low Risk, NO VTE prophylaxis required for most patients, encourage ambulation  Caprini Score 3-6: Moderate Risk , pharmacologic VTE prophylaxis is indicated for most patients (in the absence of contraindications)  Caprini Score Greater than or =7: High risk, pharmocologic VTE prophylaxis indicated for most patients (in the absence of contraindications)      OPIOID RISK TOOL    LORRI EACH BOX THAT APPLIES AND ADD TOTALS AT THE END    FAMILY HISTORY OF SUBSTANCE ABUSE                 FEMALE         MALE                                                Alcohol                             [  ]1 pt          [  ]3pts                                               Illegal Durgs                     [  ]2 pts        [  ]3pts                                               Rx Drugs                           [  ]4 pts        [  ]4 pts    PERSONAL HISTORY OF SUBSTANCE ABUSE                                                                                          Alcohol                             [  ]3 pts       [  ]3 pts                                               Illegal Drugs                     [  ]4 pts        [  ]4 pts                                               Rx Drugs                           [  ]5 pts        [  ]5 pts    AGE BETWEEN 16-45 YEARS                                      [  ]1 pt         [  ]1 pt    HISTORY OF PREADOLESCENT   SEXUAL ABUSE                                                             [  ]3 pts        [  ]0pts    PSYCHOLOGICAL DISEASE                     ADD, OCD, Bipolar, Schizophrenia        [  ]2 pts         [  ]2 pts                      Depression                                               [  ]1 pt           [  ]1 pt           SCORING TOTAL   (add numbers and type here)              (***)                                     A score of 3 or lower indicated LOW risk for future opioid abuse  A score of 4 to 7 indicated moderate risk for future opioid abuse  A score of 8 or higher indicates a high risk for opioid abuse                             This is a pleasant 73 year old  I[place]ic speaking male in NAD, accompanied by daughter Erika who assisted with history. He is presenting today for PST, PMH includes HTN, asthma (seasonal), atrial fibrillation on Xarelto s/p ablation, hepatitis, thrombocytopenia, depression, anxiety and liver cancer treated  (followed by Dr Kris Augustine). Patient c/o right upper quadrant pain. He presented to [facility] ER due to pain, abdominal US performed, as per chart " gallbladder stones and sludge, nodular appearing liver."  He reports multiple episodes of similar pain in  the past that were associated with meals and resolved spontaneously.  He reports constipation, but not states he has h/o constipation. Today, the patient denies abdominal pain. Denies fever, chills, nausea, vomiting, bloating, or diarrhea. He is tolerating low fat diet with no issues. Reports occasional chest pain and palpitations. Denies active chest pain or palpitations today. He is now scheduled for robotic cholecystectomy and liver biopsy with Dr. Ardon on 10/8/24 pending medical and cardiac clearance.          Labs and EKG  performed. Written and verbal instructions provided. Patient and daughter educated on surgical scrub, preadmission instructions, clearances and day of procedure medications, verbalizes understanding.  Patient and daughter  instructed to stop vitamins/supplements/herbal medications/ASA/NSAIDS for one week prior to surgery and discuss with PMD, verbalized understanding.  Patient and daughter instructed to speak with cardiologist regarding Xarelto for instructions, verbalized understanding. Patient and daughter verbalized understanding of instructions and was given the opportunity to ask questions. Out patient medications reviewed and verified with patient.           CAPRINI SCORE    AGE RELATED RISK FACTORS                                                             [ ] Age 41-60 years                                            (1 Point)  [X ] Age: 61-74 years                                           (2 Points)                 [ ] Age= 75 years                                                (3 Points)             DISEASE RELATED RISK FACTORS                                                       [ ] Edema in the lower extremities                 (1 Point)                     [ ] Varicose veins                                               (1 Point)                                 [X ] BMI > 25 Kg/m2                                            (1 Point)                                  [ ] Serious infection (ie PNA)                            (1 Point)                     [ ] Lung disease ( COPD, Emphysema)            (1 Point)                                                                          [ ] Acute myocardial infarction                         (1 Point)                  [ ] Congestive heart failure (in the previous month)  (1 Point)         [ ] Inflammatory bowel disease                            (1 Point)                  [ ] Central venous access, PICC or Port               (2 points)       (within the last month)                                                                [ ] Stroke (in the previous month)                        (5 Points)    [X ] Previous or present malignancy                       (2 points)                                                                                                                                                         HEMATOLOGY RELATED FACTORS                                                         [ ] Prior episodes of VTE                                     (3 Points)                     [X ] Positive family history for VTE                      (3 Points)                  [ ] Prothrombin 58123 A                                     (3 Points)                     [ ] Factor V Leiden                                                (3 Points)                        [ ] Lupus anticoagulants                                      (3 Points)                                                           [ ] Anticardiolipin antibodies                              (3 Points)                                                       [ ] High homocysteine in the blood                   (3 Points)                                             [ ] Other congenital or acquired thrombophilia      (3 Points)                                                [ ] Heparin induced thrombocytopenia                  (3 Points)                                        MOBILITY RELATED FACTORS  [ ] Bed rest                                                         (1 Point)  [ ] Plaster cast                                                    (2 points)  [ ] Bed bound for more than 72 hours           (2 Points)    GENDER SPECIFIC FACTORS  [ ] Pregnancy or had a baby within the last month   (1 Point)  [ ] Post-partum < 6 weeks                                   (1 Point)  [ ] Hormonal therapy  or oral contraception   (1 Point)  [ ] History of pregnancy complications              (1 point)  [ ] Unexplained or recurrent              (1 Point)    OTHER RISK FACTORS                                           (1 Point)  [X ] BMI >40, smoking, diabetes requiring insulin, chemotherapy  blood transfusions and length of surgery over 2 hours    SURGERY RELATED RISK FACTORS  [ ]  Section within the last month     (1 Point)  [ ] Minor surgery                                                  (1 Point)  [ ] Arthroscopic surgery                                       (2 Points)  [X ] Planned major surgery lasting more            (2 Points)      than 45 minutes     [ ] Elective hip or knee joint replacement       (5 points)       surgery                                                TRAUMA RELATED RISK FACTORS  [ ] Fracture of the hip, pelvis, or leg                       (5 Points)  [ ] Spinal cord injury resulting in paralysis             (5 points)       (in the previous month)    [ ] Paralysis  (less than 1 month)                             (5 Points)  [ ] Multiple Trauma within 1 month                        (5 Points)    Total Score [  10      ]    Caprini Score 0-2: Low Risk, NO VTE prophylaxis required for most patients, encourage ambulation  Caprini Score 3-6: Moderate Risk , pharmacologic VTE prophylaxis is indicated for most patients (in the absence of contraindications)  Caprini Score Greater than or =7: High risk, pharmocologic VTE prophylaxis indicated for most patients (in the absence of contraindications)      OPIOID RISK TOOL    LORRI EACH BOX THAT APPLIES AND ADD TOTALS AT THE END    FAMILY HISTORY OF SUBSTANCE ABUSE                 FEMALE         MALE                                                Alcohol                             [  ]1 pt          [  ]3pts                                               Illegal Durgs                     [  ]2 pts        [  ]3pts                                               Rx Drugs                           [  ]4 pts        [  ]4 pts    PERSONAL HISTORY OF SUBSTANCE ABUSE                                                                                          Alcohol                             [  ]3 pts       [  ]3 pts                                               Illegal Drugs                     [  ]4 pts        [  ]4 pts                                               Rx Drugs                           [  ]5 pts        [  ]5 pts    AGE BETWEEN 16-45 YEARS                                      [  ]1 pt         [  ]1 pt    HISTORY OF PREADOLESCENT   SEXUAL ABUSE                                                             [  ]3 pts        [  ]0pts    PSYCHOLOGICAL DISEASE                     ADD, OCD, Bipolar, Schizophrenia        [  ]2 pts         [  ]2 pts                      Depression                                               [  ]1 pt           [  X]1 pt           SCORING TOTAL   (add numbers and type here)              (**1*)                                     A score of 3 or lower indicated LOW risk for future opioid abuse  A score of 4 to 7 indicated moderate risk for future opioid abuse  A score of 8 or higher indicates a high risk for opioid abuse

## 2024-09-19 NOTE — H&P PST ADULT - ATTENDING COMMENTS
Plan for robotic cholecystectomy and liver biopsy  Risks/benefits discussed with patient. Patient understands, agrees, and wishes to proceed. All questions answered.

## 2024-09-19 NOTE — H&P PST ADULT - MUSCULOSKELETAL
details… decreased ROM/strength 5/5 bilateral upper extremities/strength 5/5 bilateral lower extremities

## 2024-09-19 NOTE — H&P PST ADULT - NSICDXPASTMEDICALHX_GEN_ALL_CORE_FT
PAST MEDICAL HISTORY:  Chronic atrial fibrillation      PAST MEDICAL HISTORY:  Chronic atrial fibrillation     H/O hepatitis     HTN (hypertension)     Liver cancer      PAST MEDICAL HISTORY:  Anxiety and depression     Chronic atrial fibrillation     H/O hepatitis     H/O thrombocytopenia     History of asthma     HTN (hypertension)     Liver cancer

## 2024-09-19 NOTE — H&P PST ADULT - NSICDXPASTSURGICALHX_GEN_ALL_CORE_FT
PAST SURGICAL HISTORY:  No pertinent past surgical history     S/P ablation of atrial fibrillation      PAST SURGICAL HISTORY:  History of cataract surgery     S/P ablation of atrial fibrillation

## 2024-09-19 NOTE — H&P PST ADULT - NSICDXFAMILYHX_GEN_ALL_CORE_FT
FAMILY HISTORY:  No pertinent family history     FAMILY HISTORY:  Father  Still living? Unknown  FH: heart disease, Age at diagnosis: Age Unknown  FH: myocardial infarction, Age at diagnosis: Age Unknown    Sibling  Still living? Unknown  Family history of blood clots, Age at diagnosis: Age Unknown  FH: diabetes mellitus, Age at diagnosis: Age Unknown  FH: stroke, Age at diagnosis: Age Unknown

## 2024-09-19 NOTE — H&P PST ADULT - PROBLEM SELECTOR PLAN 2
-- DO NOT REPLY / DO NOT REPLY ALL --  -- Message is from Engagement Center Operations ([facility]) --    ONLY TO BE USED WITHIN A REFILL MEDICATION ENCOUNTER    Med Refill  Is the patient currently having any symptoms?: No/Non-Emergent symptoms    Name of medication requested: See pended med    Has patient contacted the pharmacy? Yes    Is this the first request for the medication in the last 48 hours?: Yes    Patient is requesting a medication refill - medication is on active medication list    Patient is currently OUT of the requested medication - sent as HIGH priority      Full name of the provider who ordered the medication: Rosalia Munoz    [facility] site name / Account # for provider: [facility]    Preferred Pharmacy: Pharmacy  [id number] [place] [address] [facility]    Patient confirmed the above pharmacy as correct?  Yes          Alternative phone number: [phone] (daughter, Fernanda)    Can a detailed message be left?: Yes    Patient is completely out of medication: Verify if patient is currently experiencing symptoms. If patient is symptomatic, proceed with front end triage instead of medication refill. If patient is not symptomatic but is completely out of medication, dennis as High priority when routing. Inform patient: “Please call back with any questions or concerns and if your condition becomes life threatening, you should seek immediate medical assistance by calling 911 or going to the Emergency Department for evaluation.”    Inform all patients: \"If the clinical team needs to contact you regarding this refill, please be aware the return phone call may come from an unidentified or out of state phone number and your refill request will be addressed as soon as the clinical team reviews your message.\"   Scheduled for robotic cholecystectomy and liver biopsy with Dr. Ardon on 10/8/24 pending medical and cardiac clearance.

## 2024-10-08 ENCOUNTER — RESULT REVIEW (OUTPATIENT)
Age: 74
End: 2024-10-08

## 2024-10-08 ENCOUNTER — APPOINTMENT (OUTPATIENT)
Dept: SURGERY | Facility: HOSPITAL | Age: 74
End: 2024-10-08

## 2024-10-08 ENCOUNTER — OUTPATIENT (OUTPATIENT)
Dept: OUTPATIENT SERVICES | Facility: HOSPITAL | Age: 74
LOS: 1 days | End: 2024-10-08
Payer: COMMERCIAL

## 2024-10-08 ENCOUNTER — TRANSCRIPTION ENCOUNTER (OUTPATIENT)
Age: 74
End: 2024-10-08

## 2024-10-08 VITALS
OXYGEN SATURATION: 98 % | DIASTOLIC BLOOD PRESSURE: 87 MMHG | WEIGHT: 181.88 LBS | HEART RATE: 53 BPM | RESPIRATION RATE: 16 BRPM | SYSTOLIC BLOOD PRESSURE: 160 MMHG | TEMPERATURE: 98 F | HEIGHT: 69 IN

## 2024-10-08 VITALS
HEART RATE: 76 BPM | TEMPERATURE: 97 F | SYSTOLIC BLOOD PRESSURE: 138 MMHG | RESPIRATION RATE: 14 BRPM | OXYGEN SATURATION: 98 % | DIASTOLIC BLOOD PRESSURE: 86 MMHG

## 2024-10-08 DIAGNOSIS — Z98.49 CATARACT EXTRACTION STATUS, UNSPECIFIED EYE: Chronic | ICD-10-CM

## 2024-10-08 DIAGNOSIS — K74.60 UNSPECIFIED CIRRHOSIS OF LIVER: ICD-10-CM

## 2024-10-08 DIAGNOSIS — Z98.890 OTHER SPECIFIED POSTPROCEDURAL STATES: Chronic | ICD-10-CM

## 2024-10-08 DIAGNOSIS — Z78.9 OTHER SPECIFIED HEALTH STATUS: Chronic | ICD-10-CM

## 2024-10-08 DIAGNOSIS — K80.20 CALCULUS OF GALLBLADDER WITHOUT CHOLECYSTITIS WITHOUT OBSTRUCTION: ICD-10-CM

## 2024-10-08 LAB
APTT BLD: 29 SEC — SIGNIFICANT CHANGE UP (ref 24.5–35.6)
BASOPHILS # BLD AUTO: 0.06 K/UL — SIGNIFICANT CHANGE UP (ref 0–0.2)
BASOPHILS NFR BLD AUTO: 1 % — SIGNIFICANT CHANGE UP (ref 0–2)
BLD GP AB SCN SERPL QL: SIGNIFICANT CHANGE UP
EOSINOPHIL # BLD AUTO: 0.45 K/UL — SIGNIFICANT CHANGE UP (ref 0–0.5)
EOSINOPHIL NFR BLD AUTO: 7.7 % — HIGH (ref 0–6)
HCT VFR BLD CALC: 42 % — SIGNIFICANT CHANGE UP (ref 39–50)
HGB BLD-MCNC: 14.5 G/DL — SIGNIFICANT CHANGE UP (ref 13–17)
IMM GRANULOCYTES NFR BLD AUTO: 0.3 % — SIGNIFICANT CHANGE UP (ref 0–0.9)
INR BLD: 1.28 RATIO — HIGH (ref 0.85–1.16)
LYMPHOCYTES # BLD AUTO: 1.53 K/UL — SIGNIFICANT CHANGE UP (ref 1–3.3)
LYMPHOCYTES # BLD AUTO: 26.3 % — SIGNIFICANT CHANGE UP (ref 13–44)
MCHC RBC-ENTMCNC: 30.4 PG — SIGNIFICANT CHANGE UP (ref 27–34)
MCHC RBC-ENTMCNC: 34.5 GM/DL — SIGNIFICANT CHANGE UP (ref 32–36)
MCV RBC AUTO: 88.1 FL — SIGNIFICANT CHANGE UP (ref 80–100)
MONOCYTES # BLD AUTO: 0.53 K/UL — SIGNIFICANT CHANGE UP (ref 0–0.9)
MONOCYTES NFR BLD AUTO: 9.1 % — SIGNIFICANT CHANGE UP (ref 2–14)
NEUTROPHILS # BLD AUTO: 3.23 K/UL — SIGNIFICANT CHANGE UP (ref 1.8–7.4)
NEUTROPHILS NFR BLD AUTO: 55.6 % — SIGNIFICANT CHANGE UP (ref 43–77)
PLATELET # BLD AUTO: 97 K/UL — LOW (ref 150–400)
PROTHROM AB SERPL-ACNC: 14.4 SEC — HIGH (ref 9.9–13.4)
RBC # BLD: 4.77 M/UL — SIGNIFICANT CHANGE UP (ref 4.2–5.8)
RBC # FLD: 12.7 % — SIGNIFICANT CHANGE UP (ref 10.3–14.5)
WBC # BLD: 5.82 K/UL — SIGNIFICANT CHANGE UP (ref 3.8–10.5)
WBC # FLD AUTO: 5.82 K/UL — SIGNIFICANT CHANGE UP (ref 3.8–10.5)

## 2024-10-08 PROCEDURE — C1889: CPT

## 2024-10-08 PROCEDURE — 86901 BLOOD TYPING SEROLOGIC RH(D): CPT

## 2024-10-08 PROCEDURE — 85730 THROMBOPLASTIN TIME PARTIAL: CPT

## 2024-10-08 PROCEDURE — 88313 SPECIAL STAINS GROUP 2: CPT

## 2024-10-08 PROCEDURE — 47563 LAPARO CHOLECYSTECTOMY/GRAPH: CPT | Mod: AS,22

## 2024-10-08 PROCEDURE — 86900 BLOOD TYPING SEROLOGIC ABO: CPT

## 2024-10-08 PROCEDURE — 47563 LAPARO CHOLECYSTECTOMY/GRAPH: CPT | Mod: 22

## 2024-10-08 PROCEDURE — 47001 NDL BIOPSY LVR TM OTH MAJ PX: CPT

## 2024-10-08 PROCEDURE — 86850 RBC ANTIBODY SCREEN: CPT

## 2024-10-08 PROCEDURE — C9399: CPT

## 2024-10-08 PROCEDURE — 85025 COMPLETE CBC W/AUTO DIFF WBC: CPT

## 2024-10-08 PROCEDURE — 47563 LAPARO CHOLECYSTECTOMY/GRAPH: CPT

## 2024-10-08 PROCEDURE — 88307 TISSUE EXAM BY PATHOLOGIST: CPT

## 2024-10-08 PROCEDURE — 88304 TISSUE EXAM BY PATHOLOGIST: CPT | Mod: 26

## 2024-10-08 PROCEDURE — S2900 ROBOTIC SURGICAL SYSTEM: CPT | Mod: NC

## 2024-10-08 PROCEDURE — 36415 COLL VENOUS BLD VENIPUNCTURE: CPT

## 2024-10-08 PROCEDURE — 88304 TISSUE EXAM BY PATHOLOGIST: CPT

## 2024-10-08 PROCEDURE — 85610 PROTHROMBIN TIME: CPT

## 2024-10-08 PROCEDURE — 88312 SPECIAL STAINS GROUP 1: CPT | Mod: 26

## 2024-10-08 PROCEDURE — 47379 UNLISTED LAPS PX LIVER: CPT

## 2024-10-08 PROCEDURE — 88312 SPECIAL STAINS GROUP 1: CPT

## 2024-10-08 PROCEDURE — 88307 TISSUE EXAM BY PATHOLOGIST: CPT | Mod: 26

## 2024-10-08 PROCEDURE — 88313 SPECIAL STAINS GROUP 2: CPT | Mod: 26

## 2024-10-08 PROCEDURE — S2900: CPT

## 2024-10-08 DEVICE — ARISTA 5GM: Type: IMPLANTABLE DEVICE | Status: FUNCTIONAL

## 2024-10-08 DEVICE — LIGATING CLIPS WECK HEMOLOK POLYMER LARGE (PURPLE) 6: Type: IMPLANTABLE DEVICE | Status: FUNCTIONAL

## 2024-10-08 RX ORDER — CEFAZOLIN SODIUM 1 G
2000 VIAL (EA) INJECTION ONCE
Refills: 0 | Status: DISCONTINUED | OUTPATIENT
Start: 2024-10-08 | End: 2024-10-08

## 2024-10-08 RX ORDER — BUPIVACAINE 13.3 MG/ML
20 INJECTION, SUSPENSION, LIPOSOMAL INFILTRATION ONCE
Refills: 0 | Status: DISCONTINUED | OUTPATIENT
Start: 2024-10-08 | End: 2024-10-08

## 2024-10-08 RX ORDER — FENTANYL CITRATE-0.9 % NACL/PF 300MCG/30
25 PATIENT CONTROLLED ANALGESIA VIAL INJECTION
Refills: 0 | Status: DISCONTINUED | OUTPATIENT
Start: 2024-10-08 | End: 2024-10-08

## 2024-10-08 RX ORDER — METOPROLOL TARTRATE 100 MG/1
1 TABLET ORAL
Refills: 0 | DISCHARGE

## 2024-10-08 RX ORDER — SODIUM CHLORIDE IRRIG SOLUTION 0.9 %
1000 SOLUTION, IRRIGATION IRRIGATION
Refills: 0 | Status: DISCONTINUED | OUTPATIENT
Start: 2024-10-08 | End: 2024-10-08

## 2024-10-08 RX ORDER — SERTRALINE HYDROCHLORIDE 50 MG/1
1 TABLET, FILM COATED ORAL
Refills: 0 | DISCHARGE

## 2024-10-08 RX ORDER — INDOCYANINE GREEN 25 MG
2.5 KIT INTRAVASCULAR; INTRAVENOUS ONCE
Refills: 0 | Status: COMPLETED | OUTPATIENT
Start: 2024-10-08 | End: 2024-10-08

## 2024-10-08 RX ORDER — ACETAMINOPHEN 325 MG
975 TABLET ORAL ONCE
Refills: 0 | Status: COMPLETED | OUTPATIENT
Start: 2024-10-08 | End: 2024-10-08

## 2024-10-08 RX ORDER — ONDANSETRON HCL/PF 4 MG/2 ML
4 VIAL (ML) INJECTION ONCE
Refills: 0 | Status: DISCONTINUED | OUTPATIENT
Start: 2024-10-08 | End: 2024-10-08

## 2024-10-08 RX ORDER — ENALAPRIL MALEATE 5 MG
2.5 TABLET ORAL ONCE
Refills: 0 | Status: COMPLETED | OUTPATIENT
Start: 2024-10-08 | End: 2024-10-08

## 2024-10-08 RX ORDER — LABETALOL HYDROCHLORIDE 200 MG/1
15 TABLET, FILM COATED ORAL ONCE
Refills: 0 | Status: DISCONTINUED | OUTPATIENT
Start: 2024-10-08 | End: 2024-10-08

## 2024-10-08 RX ORDER — OXYCODONE HYDROCHLORIDE 30 MG/1
5 TABLET, FILM COATED, EXTENDED RELEASE ORAL ONCE
Refills: 0 | Status: DISCONTINUED | OUTPATIENT
Start: 2024-10-08 | End: 2024-10-08

## 2024-10-08 RX ORDER — RIVAROXABAN 10 MG/1
1 TABLET, FILM COATED ORAL
Refills: 0 | DISCHARGE

## 2024-10-08 RX ADMIN — Medication 975 MILLIGRAM(S): at 09:35

## 2024-10-08 RX ADMIN — Medication 2.5 MILLIGRAM(S): at 12:51

## 2024-10-08 RX ADMIN — INDOCYANINE GREEN 2.5 MILLIGRAM(S): KIT INTRAVASCULAR; INTRAVENOUS at 09:35

## 2024-10-08 RX ADMIN — Medication 25 MICROGRAM(S): at 13:05

## 2024-10-08 NOTE — ASU DISCHARGE PLAN (ADULT/PEDIATRIC) - ASU DC SPECIAL INSTRUCTIONSFT
Please follow-up with your surgeon in 1-2 week, call to make an appointment. Drink plenty of fluids and rest as needed. Call for any fever over 101, nausea, vomiting, severe pain, no passing of gas or bowel movement.       DIET: You may resume your regular diet as normal.       SURGICAL SITES : You may shower 48 hours post-operatively but do not bathe or soak in the water for 1-2 weeks; pat dry. If you notice any signs of surgical site infection (ie. redness, swelling, pain, pus drainage), please seek medical care immediately.       ACTIVITY : Do not lift anything heavier than 10 pounds and avoid strenuous activity for 4-6 weeks.       HOME MEDICATION: You may resume your Xarelto on Thursday 10/10 if no concern for bleeding. Please monitor for signs of bleeding.    PAIN CONTROL: You may take Motrin 600mg-800mg (with food) every 6 hours or Tylenol 650mg-1000mg every 6 hours as needed for mild pain. Stagger one medication 3 hours after the other for maximum pain control. Maximum daily dose of Tylenol should not exceed 4000mg/day.

## 2024-10-08 NOTE — ASU DISCHARGE PLAN (ADULT/PEDIATRIC) - CARE PROVIDER_API CALL
Noam Ardon Briscoe  Surgery  [address] [place] [id number]  Phone: [phone]  Fax: [phone]  Follow Up Time:

## 2024-10-08 NOTE — BRIEF OPERATIVE NOTE - NSICDXBRIEFPROCEDURE_GEN_ALL_CORE_FT
PROCEDURES:  Robot-assisted cholecystectomy 08-Oct-2024 12:21:34  Lavern Trejo  Laparoscopic biopsy of liver 08-Oct-2024 12:21:59  Lavern Trejo

## 2024-10-08 NOTE — ASU PREOP CHECKLIST - NS PREOP CHK HIBICLENS NA
N/A
Implemented All Universal Safety Interventions:  [place] to call system. Call bell, personal items and telephone within reach. Instruct patient to call for assistance. Room bathroom lighting operational. Non-slip footwear when patient is off stretcher. Physically safe environment: no spills, clutter or unnecessary equipment. Stretcher in lowest position, wheels locked, appropriate side rails in place.

## 2024-10-08 NOTE — ASU DISCHARGE PLAN (ADULT/PEDIATRIC) - FOR NEXT 24 HOURS DO NOT:
requesting provider as a consultation for clinical guidance. Specific medical abbreviations are occasionally used and those are generally approved by the American Medical Board and appropriate medical abbreviations.  The above is information on the condition for which the patient presents and any other emergent concerns during this visit. . More frequent testing may be indicated based on other chronic non-emergency medical problems. Medical management may be indicated beyond emergency medical care as addressed in this visit.    Note to patient: The Cares Act makes medical notes like these available to patients in the interest of transparency. However, be advised this is a medical document. It is intended as peer to peer communication. It is written in medical language and may contain abbreviations or verbiage that are unfamiliar. It may appear blunt or direct. Medical documents are intended to carry relevant information, facts as evident, and the clinical opinion of the practitioner.        Tho Damian PA-C  10/06/24 1745     Statement Selected

## 2024-10-08 NOTE — BRIEF OPERATIVE NOTE - NSICDXBRIEFPREOP_GEN_ALL_CORE_FT
PRE-OP DIAGNOSIS:  Other specified diseases of liver 08-Oct-2024 12:22:29  Lavern Trejo  Cholelithiasis 08-Oct-2024 12:22:17  Lavern Trejo

## 2024-10-08 NOTE — BRIEF OPERATIVE NOTE - NSICDXBRIEFPOSTOP_GEN_ALL_CORE_FT
POST-OP DIAGNOSIS:  Cholelithiasis 08-Oct-2024 12:22:37  Lavern Trejo  Other specified diseases of liver 08-Oct-2024 12:22:34  Lavern Trejo

## 2024-10-10 PROBLEM — I10 ESSENTIAL (PRIMARY) HYPERTENSION: Chronic | Status: ACTIVE | Noted: 2024-09-19

## 2024-10-10 PROBLEM — F41.9 ANXIETY DISORDER, UNSPECIFIED: Chronic | Status: ACTIVE | Noted: 2024-09-19

## 2024-10-10 PROBLEM — Z87.09 PERSONAL HISTORY OF OTHER DISEASES OF THE RESPIRATORY SYSTEM: Chronic | Status: ACTIVE | Noted: 2024-09-19

## 2024-10-10 PROBLEM — Z86.19 PERSONAL HISTORY OF OTHER INFECTIOUS AND PARASITIC DISEASES: Chronic | Status: ACTIVE | Noted: 2024-09-19

## 2024-10-10 PROBLEM — C22.9 MALIGNANT NEOPLASM OF LIVER, NOT SPECIFIED AS PRIMARY OR SECONDARY: Chronic | Status: ACTIVE | Noted: 2024-09-19

## 2024-10-10 PROBLEM — Z86.2 PERSONAL HISTORY OF DISEASES OF THE BLOOD AND BLOOD-FORMING ORGANS AND CERTAIN DISORDERS INVOLVING THE IMMUNE MECHANISM: Chronic | Status: ACTIVE | Noted: 2024-09-19

## 2024-10-11 LAB — SURGICAL PATHOLOGY STUDY: SIGNIFICANT CHANGE UP

## 2024-10-22 ENCOUNTER — APPOINTMENT (OUTPATIENT)
Dept: TRAUMA SURGERY | Facility: CLINIC | Age: 74
End: 2024-10-22

## 2024-10-24 ENCOUNTER — APPOINTMENT (OUTPATIENT)
Dept: SURGERY | Facility: CLINIC | Age: 74
End: 2024-10-24
Payer: MEDICARE

## 2024-10-24 VITALS
WEIGHT: 180 LBS | HEIGHT: 68.5 IN | HEART RATE: 68 BPM | TEMPERATURE: 97.8 F | SYSTOLIC BLOOD PRESSURE: 100 MMHG | RESPIRATION RATE: 16 BRPM | DIASTOLIC BLOOD PRESSURE: 65 MMHG | OXYGEN SATURATION: 96 % | BODY MASS INDEX: 26.97 KG/M2

## 2024-10-24 PROCEDURE — 99024 POSTOP FOLLOW-UP VISIT: CPT

## 2024-11-20 ENCOUNTER — APPOINTMENT (OUTPATIENT)
Dept: GASTROENTEROLOGY | Facility: CLINIC | Age: 74
End: 2024-11-20

## 2024-11-20 VITALS
DIASTOLIC BLOOD PRESSURE: 70 MMHG | RESPIRATION RATE: 16 BRPM | OXYGEN SATURATION: 98 % | BODY MASS INDEX: 27.89 KG/M2 | SYSTOLIC BLOOD PRESSURE: 110 MMHG | WEIGHT: 184 LBS | HEIGHT: 68 IN | HEART RATE: 70 BPM

## 2024-11-20 DIAGNOSIS — K74.60 UNSPECIFIED CIRRHOSIS OF LIVER: ICD-10-CM

## 2024-11-20 DIAGNOSIS — Z78.9 OTHER SPECIFIED HEALTH STATUS: ICD-10-CM

## 2024-11-20 DIAGNOSIS — I48.91 UNSPECIFIED ATRIAL FIBRILLATION: ICD-10-CM

## 2024-11-20 PROCEDURE — 99215 OFFICE O/P EST HI 40 MIN: CPT

## 2024-12-02 RX ORDER — LACTULOSE SOLUTION USP, 10 G/15 ML 10 G/15ML
10 SOLUTION ORAL; RECTAL 4 TIMES DAILY
Qty: 5400 | Refills: 0 | Status: ACTIVE | COMMUNITY
Start: 2024-12-02 | End: 1900-01-01

## 2024-12-30 ENCOUNTER — APPOINTMENT (OUTPATIENT)
Dept: GASTROENTEROLOGY | Facility: CLINIC | Age: 74
End: 2024-12-30
Payer: MEDICARE

## 2024-12-30 VITALS
WEIGHT: 178 LBS | RESPIRATION RATE: 14 BRPM | DIASTOLIC BLOOD PRESSURE: 80 MMHG | HEART RATE: 73 BPM | SYSTOLIC BLOOD PRESSURE: 126 MMHG | TEMPERATURE: 97.7 F | HEIGHT: 68 IN | OXYGEN SATURATION: 97 % | BODY MASS INDEX: 26.98 KG/M2

## 2024-12-30 DIAGNOSIS — K80.20 CALCULUS OF GALLBLADDER W/OUT CHOLECYSTITIS W/OUT OBSTRUCTION: ICD-10-CM

## 2024-12-30 DIAGNOSIS — K74.60 UNSPECIFIED CIRRHOSIS OF LIVER: ICD-10-CM

## 2024-12-30 DIAGNOSIS — I48.91 UNSPECIFIED ATRIAL FIBRILLATION: ICD-10-CM

## 2024-12-30 PROCEDURE — 99204 OFFICE O/P NEW MOD 45 MIN: CPT

## 2025-02-18 NOTE — PHYSICAL EXAM
[No Rash or Lesion] : No rash or lesion [Alert] : alert [Oriented to Person] : oriented to person [Oriented to Place] : oriented to place [Oriented to Time] : oriented to time [Calm] : calm [JVD] : no jugular venous distention  [de-identified] : No acute distress [de-identified] : No respiratory distress [de-identified] : Regular rate [de-identified] : soft, nontender. no rebound or guarding. Negative Yeboah's sign [de-identified] : normal range of motion (4) rarely moist

## 2025-02-21 NOTE — ASU PATIENT PROFILE, ADULT - SITE
LOV  9/24/18   No up coming appt scheduled   Last refill    5/7/19  - by Mali Mejias       Triamcinolone 0.5 % cream       Ok to refill?          
Refill: Triamcinolone 0.5% cream 15gm  
EGD

## 2025-02-21 NOTE — ASU PATIENT PROFILE, ADULT - NSICDXPASTMEDICALHX_GEN_ALL_CORE_FT
PAST MEDICAL HISTORY:  Anxiety and depression     Chronic atrial fibrillation     H/O hepatitis     H/O thrombocytopenia     History of asthma     HTN (hypertension)     Liver cancer

## 2025-02-24 ENCOUNTER — OUTPATIENT (OUTPATIENT)
Dept: OUTPATIENT SERVICES | Facility: HOSPITAL | Age: 75
LOS: 1 days | End: 2025-02-24
Payer: COMMERCIAL

## 2025-02-24 ENCOUNTER — TRANSCRIPTION ENCOUNTER (OUTPATIENT)
Age: 75
End: 2025-02-24

## 2025-02-24 ENCOUNTER — RESULT REVIEW (OUTPATIENT)
Age: 75
End: 2025-02-24

## 2025-02-24 ENCOUNTER — APPOINTMENT (OUTPATIENT)
Dept: GASTROENTEROLOGY | Facility: HOSPITAL | Age: 75
End: 2025-02-24

## 2025-02-24 VITALS
SYSTOLIC BLOOD PRESSURE: 120 MMHG | RESPIRATION RATE: 16 BRPM | OXYGEN SATURATION: 97 % | HEIGHT: 70 IN | TEMPERATURE: 98 F | WEIGHT: 179.9 LBS | HEART RATE: 77 BPM | DIASTOLIC BLOOD PRESSURE: 76 MMHG

## 2025-02-24 VITALS
HEART RATE: 63 BPM | OXYGEN SATURATION: 100 % | TEMPERATURE: 98 F | RESPIRATION RATE: 18 BRPM | DIASTOLIC BLOOD PRESSURE: 74 MMHG | SYSTOLIC BLOOD PRESSURE: 112 MMHG

## 2025-02-24 DIAGNOSIS — K74.60 UNSPECIFIED CIRRHOSIS OF LIVER: ICD-10-CM

## 2025-02-24 DIAGNOSIS — Z98.49 CATARACT EXTRACTION STATUS, UNSPECIFIED EYE: Chronic | ICD-10-CM

## 2025-02-24 DIAGNOSIS — Z98.890 OTHER SPECIFIED POSTPROCEDURAL STATES: Chronic | ICD-10-CM

## 2025-02-24 PROCEDURE — 88305 TISSUE EXAM BY PATHOLOGIST: CPT

## 2025-02-24 PROCEDURE — 43239 EGD BIOPSY SINGLE/MULTIPLE: CPT

## 2025-02-24 PROCEDURE — 88305 TISSUE EXAM BY PATHOLOGIST: CPT | Mod: 26

## 2025-02-24 PROCEDURE — 88342 IMHCHEM/IMCYTCHM 1ST ANTB: CPT | Mod: 26

## 2025-02-24 PROCEDURE — 88342 IMHCHEM/IMCYTCHM 1ST ANTB: CPT

## 2025-02-24 RX ORDER — CARBIDOPA 25 MG/1
1 TABLET ORAL
Refills: 0 | DISCHARGE

## 2025-02-25 LAB — SURGICAL PATHOLOGY STUDY: SIGNIFICANT CHANGE UP

## 2025-03-14 ENCOUNTER — NON-APPOINTMENT (OUTPATIENT)
Age: 75
End: 2025-03-14

## 2025-03-17 ENCOUNTER — APPOINTMENT (OUTPATIENT)
Dept: GASTROENTEROLOGY | Facility: CLINIC | Age: 75
End: 2025-03-17
Payer: MEDICARE

## 2025-03-17 VITALS
BODY MASS INDEX: 26.52 KG/M2 | SYSTOLIC BLOOD PRESSURE: 119 MMHG | HEART RATE: 72 BPM | HEIGHT: 68 IN | DIASTOLIC BLOOD PRESSURE: 82 MMHG | RESPIRATION RATE: 14 BRPM | WEIGHT: 175 LBS | OXYGEN SATURATION: 98 % | TEMPERATURE: 97.6 F

## 2025-03-17 DIAGNOSIS — K21.00 GASTRO-ESOPHAGEAL REFLUX DISEASE WITH ESOPHAGITIS, WITHOUT BLEEDING: ICD-10-CM

## 2025-03-17 DIAGNOSIS — K74.60 UNSPECIFIED CIRRHOSIS OF LIVER: ICD-10-CM

## 2025-03-17 DIAGNOSIS — Z09 ENCOUNTER FOR FOLLOW-UP EXAMINATION AFTER COMPLETED TREATMENT FOR CONDITIONS OTHER THAN MALIGNANT NEOPLASM: ICD-10-CM

## 2025-03-17 PROCEDURE — 99215 OFFICE O/P EST HI 40 MIN: CPT

## 2025-03-17 RX ORDER — OMEPRAZOLE 20 MG/1
20 CAPSULE, DELAYED RELEASE ORAL DAILY
Qty: 30 | Refills: 0 | Status: ACTIVE | COMMUNITY
Start: 2025-03-17 | End: 1900-01-01

## 2025-03-17 RX ORDER — CARBIDOPA AND LEVODOPA 25; 100 MG/1; MG/1
25-100 TABLET ORAL
Refills: 0 | Status: ACTIVE | COMMUNITY

## 2025-04-23 ENCOUNTER — OFFICE (OUTPATIENT)
Dept: URBAN - METROPOLITAN AREA CLINIC 94 | Facility: CLINIC | Age: 75
Setting detail: OPHTHALMOLOGY
End: 2025-04-23
Payer: COMMERCIAL

## 2025-04-23 ENCOUNTER — RX ONLY (RX ONLY)
Age: 75
End: 2025-04-23

## 2025-04-23 DIAGNOSIS — H16.223: ICD-10-CM

## 2025-04-23 DIAGNOSIS — Z96.1: ICD-10-CM

## 2025-04-23 DIAGNOSIS — H35.033: ICD-10-CM

## 2025-04-23 PROCEDURE — 92250 FUNDUS PHOTOGRAPHY W/I&R: CPT | Performed by: OPHTHALMOLOGY

## 2025-04-23 PROCEDURE — 92014 COMPRE OPH EXAM EST PT 1/>: CPT | Performed by: OPHTHALMOLOGY

## 2025-04-23 ASSESSMENT — REFRACTION_CURRENTRX
OD_SPHERE: +1.75
OS_CYLINDER: -0.50
OS_OVR_VA: 20/
OD_CYLINDER: PLANO
OD_OVR_VA: 20/
OS_OVR_VA: 20/
OS_SPHERE: +2.00
OD_CYLINDER: SPH
OS_VPRISM_DIRECTION: SV
OS_AXIS: 080
OD_OVR_VA: 20/
OS_AXIS: 080
OS_SPHERE: +2.25
OS_CYLINDER: -0.75
OD_VPRISM_DIRECTION: SV
OD_SPHERE: +1.75

## 2025-04-23 ASSESSMENT — REFRACTION_MANIFEST
OS_VA1: 20/30
OD_CYLINDER: SPH
OD_SPHERE: +2.00
OD_VA1: 20/25
OD_ADD: +2.50
OD_SPHERE: +2.75
OD_SPHERE: PLANO
OD_AXIS: 091
OD_VA1: 20/25
OS_SPHERE: +2.75
OD_SPHERE: +2.00
OS_VA1: 20/25
OS_AXIS: 075
OS_AXIS: 080
OS_AXIS: 087
OD_ADD: +2.50
OS_AXIS: 080
OS_CYLINDER: -0.50
OS_SPHERE: +2.00
OD_CYLINDER: SPH
OD_CYLINDER: -0.75
OS_CYLINDER: -1.25
OS_VA1: 20/30
OD_ADD: +2.50
OS_ADD: +2.50
OS_CYLINDER: -0.25
OS_CYLINDER: -0.50
OU_VA: 20/25
OD_VA1: 20/30
OD_VA1: 20/30
OS_SPHERE: PLANO
OS_ADD: +2.50
OS_SPHERE: +2.00
OS_ADD: +2.50
OS_VA1: 20/30

## 2025-04-23 ASSESSMENT — REFRACTION_AUTOREFRACTION
OS_CYLINDER: -0.50
OS_AXIS: 082
OD_SPHERE: -0.25
OD_AXIS: 046
OS_SPHERE: 0.00
OD_CYLINDER: -0.25

## 2025-04-23 ASSESSMENT — KERATOMETRY
OS_K1POWER_DIOPTERS: 41.75
OS_K2POWER_DIOPTERS: 41.75
OD_K2POWER_DIOPTERS: 41.75
OD_AXISANGLE_DEGREES: 099
OD_K1POWER_DIOPTERS: 41.50
OS_AXISANGLE_DEGREES: 090

## 2025-04-23 ASSESSMENT — CONFRONTATIONAL VISUAL FIELD TEST (CVF)
OD_FINDINGS: FULL
OS_FINDINGS: FULL

## 2025-04-23 ASSESSMENT — VISUAL ACUITY
OS_BCVA: 20/30
OD_BCVA: 20/30-1

## 2025-04-23 ASSESSMENT — SUPERFICIAL PUNCTATE KERATITIS (SPK)
OD_SPK: 2+
OS_SPK: 2+

## 2025-04-23 ASSESSMENT — TONOMETRY
OD_IOP_MMHG: 13
OS_IOP_MMHG: 14

## 2025-06-13 NOTE — ED PROVIDER NOTE - DOMESTIC TRAVEL HIGH RISK QUESTION
No stridor. No wheezing, rhonchi or rales.   Chest:      Chest wall: No tenderness.   Abdominal:      General: Abdomen is flat. Bowel sounds are normal. There is no distension.      Palpations: Abdomen is soft. There is no shifting dullness, fluid wave, hepatomegaly, splenomegaly, mass or pulsatile mass.      Tenderness: There is no abdominal tenderness. There is no right CVA tenderness, left CVA tenderness, guarding or rebound. Negative signs include Lucas's sign, Rovsing's sign, McBurney's sign, psoas sign and obturator sign.      Hernia: No hernia is present.   Musculoskeletal:         General: Normal range of motion.      Cervical back: Normal range of motion and neck supple. No rigidity or tenderness.   Lymphadenopathy:      Cervical: No cervical adenopathy.   Skin:     General: Skin is warm and dry.      Capillary Refill: Capillary refill takes less than 2 seconds.   Neurological:      General: No focal deficit present.      Mental Status: She is alert and oriented to person, place, and time.   Psychiatric:         Mood and Affect: Mood normal.         Behavior: Behavior normal.         Thought Content: Thought content normal.         Judgment: Judgment normal.           Medical problems and test results were reviewed with the patient today.     Lab Results   Component Value Date     06/10/2025    K 3.9 06/10/2025     06/10/2025    CO2 26 06/10/2025    BUN 14 06/10/2025    CREATININE 0.84 06/10/2025    GLUCOSE 82 06/10/2025    CALCIUM 9.5 06/10/2025    BILITOT 0.5 06/10/2025    ALKPHOS 72 06/10/2025    AST 25 06/10/2025    ALT 38 06/10/2025    LABGLOM >90 06/10/2025    GLOB 2.9 06/10/2025         Lab Results   Component Value Date    WBC 6.6 06/10/2025    HGB 13.0 06/10/2025    HCT 39.3 06/10/2025    MCV 83.4 06/10/2025     06/10/2025     Hemoglobin A1C   Date Value Ref Range Status   06/10/2025 5.0 0 - 5.6 % Final     Comment:     Reference Range  Normal       <5.7%  Prediabetes  
No

## (undated) DEVICE — SENSOR O2 FINGER ADULT

## (undated) DEVICE — VENODYNE/SCD SLEEVE CALF MEDIUM

## (undated) DEVICE — FORCEP RADIAL JAW 4 W NDL 2.4MM 2.8MM 240CM ORANGE DISP

## (undated) DEVICE — UNDERPAD LINEN SAVER 23 X 36"

## (undated) DEVICE — CATH IV SAFE BC 22G X 1" (BLUE)

## (undated) DEVICE — MASK PROCEDURE EARLOOP LVL 2 50/BX

## (undated) DEVICE — SOL IRR BAG H2O 1000ML

## (undated) DEVICE — XI OBTURATOR OPTICAL BLADELESS 8MM

## (undated) DEVICE — ELCTR GROUNDING PAD ADULT COVIDIEN

## (undated) DEVICE — DRAPE GENERAL ENDOSCOPY

## (undated) DEVICE — XI DRAPE COLUMN

## (undated) DEVICE — INSUFFLATION NDL COVIDIEN SURGINEEDLE VERESS 120MM

## (undated) DEVICE — SYR SLIP 10CC

## (undated) DEVICE — XI ARM PERMANENT CAUTERY HOOK

## (undated) DEVICE — PACK IV START WITH CHG

## (undated) DEVICE — XI ARM CLIP APPLIER LARGE

## (undated) DEVICE — TUBING IV EXTENSION MACRO W CLAVE 7"

## (undated) DEVICE — SUT VICRYL PLUS 0 27" CT-2 UNDYED

## (undated) DEVICE — XI ARM FORCE BIPOLAR 8MM

## (undated) DEVICE — D HELP - CLEARVIEW CLEARIFY SYSTEM

## (undated) DEVICE — SUT MONOCRYL 4-0 27" PS-2 UNDYED

## (undated) DEVICE — XI SEAL UNIVERSIAL 5-12MM

## (undated) DEVICE — PACK ROBOTIC

## (undated) DEVICE — DRSG 2X2

## (undated) DEVICE — SYR LUER SLIP TIP 50CC

## (undated) DEVICE — DRAPE TOWEL BLUE STICKY

## (undated) DEVICE — Device

## (undated) DEVICE — BITE BLOCK ADULT 20 X 27MM (GREEN)

## (undated) DEVICE — GLV 7.5 PROTEXIS (WHITE)

## (undated) DEVICE — ENDOCATCH 10MM SPECIMEN POUCH

## (undated) DEVICE — ELCTR BOVIE PENCIL SMOKE EVACUATION 15FT

## (undated) DEVICE — DRAPE XL SHEET 77X98"

## (undated) DEVICE — XI CORD MONOPOLAR CAUTERY (GREEN)

## (undated) DEVICE — SOL IRR BAG NS 0.9% 1000ML

## (undated) DEVICE — SYR IV FLUSH SALINE 10ML 30/TY

## (undated) DEVICE — SOL IRR POUR NS 0.9% 1000ML

## (undated) DEVICE — DRSG DERMABOND 0.7ML

## (undated) DEVICE — WARMING BLANKET FULL ADULT

## (undated) DEVICE — XI CORD BIPOLAR CAUTERY (BLUE)

## (undated) DEVICE — XI ARM FORCEP PROGRASP 8MM

## (undated) DEVICE — DENTURE CUP PINK

## (undated) DEVICE — SOL BAG NS 0.9% 1000ML

## (undated) DEVICE — GOWN IMPERV XL

## (undated) DEVICE — DRSG CURITY GAUZE SPONGE 4 X 4" 12-PLY NON-STERILE

## (undated) DEVICE — XI DRAPE ARM

## (undated) DEVICE — GLV 8 PROTEXIS (BLUE)

## (undated) DEVICE — NDL BIOPSY MAX COR 18G X 20CM

## (undated) DEVICE — SOL IRR POUR H2O 1000ML

## (undated) DEVICE — STAPLER SKIN PROXIMATE

## (undated) DEVICE — TUBING ALARIS PUMP MODULE NON-DEHP